# Patient Record
Sex: MALE | Race: WHITE | NOT HISPANIC OR LATINO
[De-identification: names, ages, dates, MRNs, and addresses within clinical notes are randomized per-mention and may not be internally consistent; named-entity substitution may affect disease eponyms.]

---

## 2017-09-20 ENCOUNTER — APPOINTMENT (OUTPATIENT)
Dept: FAMILY MEDICINE | Facility: CLINIC | Age: 62
End: 2017-09-20
Payer: MEDICARE

## 2017-09-20 VITALS
HEART RATE: 84 BPM | SYSTOLIC BLOOD PRESSURE: 128 MMHG | WEIGHT: 200 LBS | OXYGEN SATURATION: 97 % | RESPIRATION RATE: 16 BRPM | HEIGHT: 68 IN | DIASTOLIC BLOOD PRESSURE: 78 MMHG | BODY MASS INDEX: 30.31 KG/M2

## 2017-09-20 DIAGNOSIS — Z82.49 FAMILY HISTORY OF ISCHEMIC HEART DISEASE AND OTHER DISEASES OF THE CIRCULATORY SYSTEM: ICD-10-CM

## 2017-09-20 DIAGNOSIS — Z78.9 OTHER SPECIFIED HEALTH STATUS: ICD-10-CM

## 2017-09-20 DIAGNOSIS — Z87.891 PERSONAL HISTORY OF NICOTINE DEPENDENCE: ICD-10-CM

## 2017-09-20 DIAGNOSIS — M54.5 LOW BACK PAIN: ICD-10-CM

## 2017-09-20 PROCEDURE — 99213 OFFICE O/P EST LOW 20 MIN: CPT

## 2017-09-20 RX ORDER — FLUTICASONE PROPIONATE 50 UG/1
50 SPRAY, METERED NASAL
Qty: 48 | Refills: 0 | Status: ACTIVE | COMMUNITY
Start: 2017-08-23

## 2017-10-25 ENCOUNTER — APPOINTMENT (OUTPATIENT)
Dept: FAMILY MEDICINE | Facility: CLINIC | Age: 62
End: 2017-10-25
Payer: MEDICARE

## 2017-10-25 VITALS
SYSTOLIC BLOOD PRESSURE: 138 MMHG | WEIGHT: 200 LBS | RESPIRATION RATE: 16 BRPM | OXYGEN SATURATION: 98 % | HEIGHT: 68 IN | DIASTOLIC BLOOD PRESSURE: 84 MMHG | BODY MASS INDEX: 30.31 KG/M2 | HEART RATE: 75 BPM | TEMPERATURE: 98.3 F

## 2017-10-25 PROCEDURE — 99213 OFFICE O/P EST LOW 20 MIN: CPT

## 2017-11-09 ENCOUNTER — APPOINTMENT (OUTPATIENT)
Dept: FAMILY MEDICINE | Facility: CLINIC | Age: 62
End: 2017-11-09
Payer: MEDICARE

## 2017-11-09 VITALS
RESPIRATION RATE: 14 BRPM | BODY MASS INDEX: 29.7 KG/M2 | SYSTOLIC BLOOD PRESSURE: 134 MMHG | HEIGHT: 68 IN | WEIGHT: 196 LBS | DIASTOLIC BLOOD PRESSURE: 76 MMHG | HEART RATE: 84 BPM

## 2017-11-09 DIAGNOSIS — M54.12 RADICULOPATHY, CERVICAL REGION: ICD-10-CM

## 2017-11-09 PROCEDURE — 99213 OFFICE O/P EST LOW 20 MIN: CPT

## 2017-12-16 ENCOUNTER — RX RENEWAL (OUTPATIENT)
Age: 62
End: 2017-12-16

## 2018-01-20 ENCOUNTER — RX RENEWAL (OUTPATIENT)
Age: 63
End: 2018-01-20

## 2018-02-09 ENCOUNTER — APPOINTMENT (OUTPATIENT)
Dept: FAMILY MEDICINE | Facility: CLINIC | Age: 63
End: 2018-02-09
Payer: MEDICARE

## 2018-02-09 VITALS
HEIGHT: 68 IN | SYSTOLIC BLOOD PRESSURE: 118 MMHG | BODY MASS INDEX: 29.7 KG/M2 | RESPIRATION RATE: 14 BRPM | DIASTOLIC BLOOD PRESSURE: 64 MMHG | WEIGHT: 196 LBS | HEART RATE: 89 BPM | OXYGEN SATURATION: 97 %

## 2018-02-09 PROCEDURE — 99213 OFFICE O/P EST LOW 20 MIN: CPT

## 2018-02-09 RX ORDER — OXYCODONE 5 MG/1
5 TABLET ORAL
Qty: 30 | Refills: 0 | Status: DISCONTINUED | COMMUNITY
Start: 2017-08-17 | End: 2018-02-09

## 2018-02-09 RX ORDER — PREGABALIN 75 MG/1
75 CAPSULE ORAL
Qty: 60 | Refills: 0 | Status: DISCONTINUED | COMMUNITY
Start: 2017-10-10 | End: 2018-02-09

## 2018-02-09 RX ORDER — GABAPENTIN 300 MG/1
300 CAPSULE ORAL
Qty: 100 | Refills: 0 | Status: DISCONTINUED | COMMUNITY
Start: 2017-09-26 | End: 2018-02-09

## 2018-02-09 RX ORDER — OXYCODONE AND ACETAMINOPHEN 10; 325 MG/1; MG/1
10-325 TABLET ORAL
Qty: 100 | Refills: 0 | Status: DISCONTINUED | COMMUNITY
Start: 2017-07-06 | End: 2018-02-09

## 2018-02-09 RX ORDER — AZELASTINE HYDROCHLORIDE 205.5 UG/1
0.15 SPRAY, METERED NASAL
Qty: 30 | Refills: 0 | Status: DISCONTINUED | COMMUNITY
Start: 2017-05-23 | End: 2018-02-09

## 2018-02-09 RX ORDER — OXYCODONE AND ACETAMINOPHEN 5; 325 MG/1; MG/1
5-325 TABLET ORAL
Qty: 75 | Refills: 0 | Status: DISCONTINUED | COMMUNITY
Start: 2017-10-25 | End: 2018-02-09

## 2018-02-09 RX ORDER — PREGABALIN 150 MG/1
150 CAPSULE ORAL
Qty: 60 | Refills: 0 | Status: DISCONTINUED | COMMUNITY
Start: 2017-12-19 | End: 2018-02-09

## 2018-02-09 RX ORDER — AMOXICILLIN AND CLAVULANATE POTASSIUM 875; 125 MG/1; MG/1
875-125 TABLET, COATED ORAL TWICE DAILY
Qty: 20 | Refills: 0 | Status: DISCONTINUED | COMMUNITY
Start: 2017-10-25 | End: 2018-02-09

## 2018-03-12 ENCOUNTER — RX RENEWAL (OUTPATIENT)
Age: 63
End: 2018-03-12

## 2018-03-13 ENCOUNTER — RX RENEWAL (OUTPATIENT)
Age: 63
End: 2018-03-13

## 2018-05-03 ENCOUNTER — APPOINTMENT (OUTPATIENT)
Dept: FAMILY MEDICINE | Facility: CLINIC | Age: 63
End: 2018-05-03

## 2018-05-11 ENCOUNTER — APPOINTMENT (OUTPATIENT)
Dept: FAMILY MEDICINE | Facility: CLINIC | Age: 63
End: 2018-05-11
Payer: MEDICARE

## 2018-05-11 VITALS
SYSTOLIC BLOOD PRESSURE: 118 MMHG | HEIGHT: 68 IN | BODY MASS INDEX: 31.07 KG/M2 | HEART RATE: 90 BPM | OXYGEN SATURATION: 97 % | DIASTOLIC BLOOD PRESSURE: 72 MMHG | WEIGHT: 205 LBS

## 2018-05-11 DIAGNOSIS — M54.2 CERVICALGIA: ICD-10-CM

## 2018-05-11 PROCEDURE — 99213 OFFICE O/P EST LOW 20 MIN: CPT

## 2018-05-11 RX ORDER — ALPRAZOLAM 0.5 MG/1
0.5 TABLET ORAL
Qty: 75 | Refills: 0 | Status: DISCONTINUED | COMMUNITY
Start: 2017-07-06 | End: 2018-05-11

## 2018-05-19 LAB — HEMOCCULT STL QL IA: NEGATIVE

## 2018-06-09 ENCOUNTER — RX RENEWAL (OUTPATIENT)
Age: 63
End: 2018-06-09

## 2018-06-13 ENCOUNTER — APPOINTMENT (OUTPATIENT)
Dept: FAMILY MEDICINE | Facility: CLINIC | Age: 63
End: 2018-06-13
Payer: MEDICARE

## 2018-06-13 VITALS
WEIGHT: 205 LBS | BODY MASS INDEX: 31.17 KG/M2 | HEART RATE: 85 BPM | OXYGEN SATURATION: 97 % | SYSTOLIC BLOOD PRESSURE: 126 MMHG | DIASTOLIC BLOOD PRESSURE: 70 MMHG

## 2018-06-13 PROCEDURE — 99213 OFFICE O/P EST LOW 20 MIN: CPT

## 2018-06-13 RX ORDER — METHYLPREDNISOLONE 4 MG/1
4 TABLET ORAL
Qty: 80 | Refills: 0 | Status: DISCONTINUED | COMMUNITY
Start: 2017-08-29 | End: 2018-06-13

## 2018-06-13 RX ORDER — ALPRAZOLAM 0.5 MG/1
0.5 TABLET ORAL
Qty: 100 | Refills: 0 | Status: DISCONTINUED | COMMUNITY
Start: 2018-02-09 | End: 2018-06-13

## 2018-06-13 NOTE — PHYSICAL EXAM
[No Acute Distress] : no acute distress [Well Nourished] : well nourished [Well Developed] : well developed [Well-Appearing] : well-appearing [Normal Outer Ear/Nose] : the outer ears and nose were normal in appearance [Normal TMs] : both tympanic membranes were normal [No JVD] : no jugular venous distention [Supple] : supple [No Lymphadenopathy] : no lymphadenopathy [No Respiratory Distress] : no respiratory distress  [Normal Rate] : normal rate  [Regular Rhythm] : with a regular rhythm [No Carotid Bruits] : no carotid bruits [Normal Posterior Cervical Nodes] : no posterior cervical lymphadenopathy [Normal Anterior Cervical Nodes] : no anterior cervical lymphadenopathy [de-identified] : watery nasal drainage [de-identified] : has longhi

## 2018-06-13 NOTE — REVIEW OF SYSTEMS
[Postnasal Drip] : postnasal drip [Nasal Discharge] : nasal discharge [Sore Throat] : sore throat [Cough] : cough [Negative] : Heme/Lymph

## 2018-06-13 NOTE — ASSESSMENT
[FreeTextEntry1] : rx's for cefuroxime 500 mg 1 bid pc and benzonate 100 mg 2 tid prn and drink more water.

## 2018-07-30 ENCOUNTER — RX RENEWAL (OUTPATIENT)
Age: 63
End: 2018-07-30

## 2018-08-13 ENCOUNTER — APPOINTMENT (OUTPATIENT)
Dept: FAMILY MEDICINE | Facility: CLINIC | Age: 63
End: 2018-08-13
Payer: MEDICARE

## 2018-08-13 VITALS
BODY MASS INDEX: 31.07 KG/M2 | OXYGEN SATURATION: 98 % | WEIGHT: 205 LBS | HEIGHT: 68 IN | DIASTOLIC BLOOD PRESSURE: 70 MMHG | HEART RATE: 78 BPM | SYSTOLIC BLOOD PRESSURE: 124 MMHG | RESPIRATION RATE: 14 BRPM

## 2018-08-13 PROCEDURE — 99213 OFFICE O/P EST LOW 20 MIN: CPT

## 2018-08-13 RX ORDER — CEFUROXIME AXETIL 500 MG/1
500 TABLET ORAL
Qty: 20 | Refills: 0 | Status: DISCONTINUED | COMMUNITY
Start: 2018-06-13 | End: 2018-08-13

## 2018-08-13 RX ORDER — OXYCODONE AND ACETAMINOPHEN 10; 325 MG/1; MG/1
10-325 TABLET ORAL
Qty: 50 | Refills: 0 | Status: DISCONTINUED | COMMUNITY
Start: 2018-05-11 | End: 2018-08-13

## 2018-08-13 NOTE — ASSESSMENT
[FreeTextEntry1] : 1. Treatment for chronic pain was renewed - percocet 10/325 # 60 1 bid prn- continue all other treatments to try to reduce pain.  2. HTN - /70 - well controlled on losartan and it is UTD- diet reviewed.

## 2018-08-13 NOTE — PHYSICAL EXAM
[No Acute Distress] : no acute distress [Well Nourished] : well nourished [Well Developed] : well developed [Well-Appearing] : well-appearing [No JVD] : no jugular venous distention [No Lymphadenopathy] : no lymphadenopathy [No Respiratory Distress] : no respiratory distress  [Clear to Auscultation] : lungs were clear to auscultation bilaterally [Normal Rate] : normal rate  [Regular Rhythm] : with a regular rhythm [No Murmur] : no murmur heard [No Carotid Bruits] : no carotid bruits [Normal Posterior Cervical Nodes] : no posterior cervical lymphadenopathy [Normal Anterior Cervical Nodes] : no anterior cervical lymphadenopathy [de-identified] : pain with rom [de-identified] : tender in left lumbar paravertebral muscles , pain with rom

## 2018-08-13 NOTE — HISTORY OF PRESENT ILLNESS
[FreeTextEntry1] : patient presents for 3 month check  [de-identified] : Having daily lower back pain on left side and left hip.  He is seeing pain management - Dr Guerra - getting epidural injections and doing accupuncture - Dr Baca.  Still having pain. Also recheck HTN.

## 2018-09-27 ENCOUNTER — RX RENEWAL (OUTPATIENT)
Age: 63
End: 2018-09-27

## 2018-10-01 ENCOUNTER — RX RENEWAL (OUTPATIENT)
Age: 63
End: 2018-10-01

## 2018-11-13 ENCOUNTER — APPOINTMENT (OUTPATIENT)
Dept: FAMILY MEDICINE | Facility: CLINIC | Age: 63
End: 2018-11-13
Payer: MEDICARE

## 2018-11-13 VITALS
TEMPERATURE: 98.6 F | SYSTOLIC BLOOD PRESSURE: 132 MMHG | HEART RATE: 76 BPM | BODY MASS INDEX: 31.07 KG/M2 | HEIGHT: 68 IN | RESPIRATION RATE: 16 BRPM | DIASTOLIC BLOOD PRESSURE: 80 MMHG | WEIGHT: 205 LBS | OXYGEN SATURATION: 97 %

## 2018-11-13 DIAGNOSIS — Z00.00 ENCOUNTER FOR GENERAL ADULT MEDICAL EXAMINATION W/OUT ABNORMAL FINDINGS: ICD-10-CM

## 2018-11-13 PROCEDURE — G0439: CPT

## 2018-11-13 RX ORDER — OXYCODONE AND ACETAMINOPHEN 10; 325 MG/1; MG/1
10-325 TABLET ORAL
Qty: 60 | Refills: 0 | Status: DISCONTINUED | COMMUNITY
Start: 2018-08-13 | End: 2018-11-13

## 2018-11-13 NOTE — REVIEW OF SYSTEMS
[Joint Pain] : joint pain [Joint Stiffness] : joint stiffness [Back Pain] : back pain [Mole Changes] : mole changes [Negative] : Heme/Lymph

## 2018-11-13 NOTE — HISTORY OF PRESENT ILLNESS
[FreeTextEntry1] : 3 month follow up and would like to discuss results of MRI and upcoming back surgery. [de-identified] : Here for wellness exam  and renewal of Meds. He is fasting for lab.

## 2018-11-13 NOTE — PHYSICAL EXAM
[No Acute Distress] : no acute distress [Well-Appearing] : well-appearing [Normal Sclera/Conjunctiva] : normal sclera/conjunctiva [PERRL] : pupils equal round and reactive to light [EOMI] : extraocular movements intact [Normal Outer Ear/Nose] : the outer ears and nose were normal in appearance [Normal Oropharynx] : the oropharynx was normal [No JVD] : no jugular venous distention [Supple] : supple [No Lymphadenopathy] : no lymphadenopathy [Thyroid Normal, No Nodules] : the thyroid was normal and there were no nodules present [No Respiratory Distress] : no respiratory distress  [Clear to Auscultation] : lungs were clear to auscultation bilaterally [Normal Rate] : normal rate  [Regular Rhythm] : with a regular rhythm [Normal S1, S2] : normal S1 and S2 [No Murmur] : no murmur heard [No Carotid Bruits] : no carotid bruits [No Varicosities] : no varicosities [Pedal Pulses Present] : the pedal pulses are present [No Edema] : there was no peripheral edema [No Extremity Clubbing/Cyanosis] : no extremity clubbing/cyanosis [Soft] : abdomen soft [Non Tender] : non-tender [Non-distended] : non-distended [No Masses] : no abdominal mass palpated [No HSM] : no HSM [Normal Bowel Sounds] : normal bowel sounds [Declined Rectal Exam] : declined rectal exam [Normal Posterior Cervical Nodes] : no posterior cervical lymphadenopathy [Normal Anterior Cervical Nodes] : no anterior cervical lymphadenopathy [No Joint Swelling] : no joint swelling [Grossly Normal Strength/Tone] : grossly normal strength/tone [No Rash] : no rash [Normal Gait] : normal gait [Coordination Grossly Intact] : coordination grossly intact [No Focal Deficits] : no focal deficits [Deep Tendon Reflexes (DTR)] : deep tendon reflexes were 2+ and symmetric [Speech Grossly Normal] : speech grossly normal [Memory Grossly Normal] : memory grossly normal [Normal Affect] : the affect was normal [Alert and Oriented x3] : oriented to person, place, and time [Normal Mood] : the mood was normal [Normal Insight/Judgement] : insight and judgment were intact [de-identified] : overweight [de-identified] : limited exam by body habitus [de-identified] : tender in lumbar spine, pain with rom [de-identified] : Numerous skin changes  just had lesions removed on right chest and right forehead

## 2018-11-13 NOTE — ASSESSMENT
[FreeTextEntry1] : Today I renewed his alprazolam . He gave me a copy of his latest MRI of LS spine and he will be having another lumbar surgery in January by Dr Granados. He is having increased pain. Rx for tramadol 50 mg given - he can use 1-2  q 6h prn - 1 week supply sent in but I stressed only use when needed. Use/precautions /safety reviewed.  Lab done here today as part of his wellness exam. No vaccinations.

## 2018-11-13 NOTE — HEALTH RISK ASSESSMENT
[No falls in past year] : Patient reported no falls in the past year [0] : 2) Feeling down, depressed, or hopeless: Not at all (0) [] : No [de-identified] : rare [[id number]] : 0

## 2018-11-14 LAB
25(OH)D3 SERPL-MCNC: 40.9 NG/ML
ALBUMIN SERPL ELPH-MCNC: 4.2 G/DL
ALP BLD-CCNC: 67 U/L
ALT SERPL-CCNC: 21 U/L
ANION GAP SERPL CALC-SCNC: 14 MMOL/L
AST SERPL-CCNC: 20 U/L
BASOPHILS # BLD AUTO: 0.03 K/UL
BASOPHILS NFR BLD AUTO: 0.2 %
BILIRUB SERPL-MCNC: 0.5 MG/DL
BUN SERPL-MCNC: 16 MG/DL
CALCIUM SERPL-MCNC: 9.6 MG/DL
CHLORIDE SERPL-SCNC: 105 MMOL/L
CHOLEST SERPL-MCNC: 197 MG/DL
CHOLEST/HDLC SERPL: 4.2 RATIO
CK SERPL-CCNC: 73 U/L
CO2 SERPL-SCNC: 23 MMOL/L
CREAT SERPL-MCNC: 0.87 MG/DL
EOSINOPHIL # BLD AUTO: 0.09 K/UL
EOSINOPHIL NFR BLD AUTO: 0.7 %
FOLATE SERPL-MCNC: 19.1 NG/ML
GLUCOSE SERPL-MCNC: 94 MG/DL
HBA1C MFR BLD HPLC: 6.1 %
HCT VFR BLD CALC: 46.6 %
HCV AB SER QL: NONREACTIVE
HCV S/CO RATIO: 0.11 S/CO
HDLC SERPL-MCNC: 47 MG/DL
HGB BLD-MCNC: 15.4 G/DL
IMM GRANULOCYTES NFR BLD AUTO: 0.3 %
LDLC SERPL CALC-MCNC: 122 MG/DL
LYMPHOCYTES # BLD AUTO: 3.61 K/UL
LYMPHOCYTES NFR BLD AUTO: 27.3 %
MAN DIFF?: NORMAL
MCHC RBC-ENTMCNC: 31 PG
MCHC RBC-ENTMCNC: 33 GM/DL
MCV RBC AUTO: 94 FL
MONOCYTES # BLD AUTO: 0.97 K/UL
MONOCYTES NFR BLD AUTO: 7.3 %
NEUTROPHILS # BLD AUTO: 8.46 K/UL
NEUTROPHILS NFR BLD AUTO: 64.2 %
PLATELET # BLD AUTO: 328 K/UL
POTASSIUM SERPL-SCNC: 4.6 MMOL/L
PROT SERPL-MCNC: 7.6 G/DL
PSA SERPL-MCNC: 1.22 NG/ML
RBC # BLD: 4.96 M/UL
RBC # FLD: 16.4 %
SODIUM SERPL-SCNC: 142 MMOL/L
T3 SERPL-MCNC: 107 NG/DL
T3FREE SERPL-MCNC: 3.37 PG/ML
T4 FREE SERPL-MCNC: 1.5 NG/DL
T4 SERPL-MCNC: 7.1 UG/DL
TRIGL SERPL-MCNC: 141 MG/DL
TSH SERPL-ACNC: 1.28 UIU/ML
VIT B12 SERPL-MCNC: 694 PG/ML
WBC # FLD AUTO: 13.2 K/UL

## 2018-11-23 ENCOUNTER — LABORATORY RESULT (OUTPATIENT)
Age: 63
End: 2018-11-23

## 2018-11-28 ENCOUNTER — APPOINTMENT (OUTPATIENT)
Dept: SURGICAL ONCOLOGY | Facility: CLINIC | Age: 63
End: 2018-11-28
Payer: MEDICARE

## 2018-11-28 VITALS
HEART RATE: 88 BPM | OXYGEN SATURATION: 93 % | SYSTOLIC BLOOD PRESSURE: 134 MMHG | DIASTOLIC BLOOD PRESSURE: 91 MMHG | TEMPERATURE: 98.2 F | BODY MASS INDEX: 30.77 KG/M2 | WEIGHT: 203 LBS | HEIGHT: 68 IN

## 2018-11-28 PROCEDURE — 99204 OFFICE O/P NEW MOD 45 MIN: CPT

## 2018-12-11 ENCOUNTER — FORM ENCOUNTER (OUTPATIENT)
Age: 63
End: 2018-12-11

## 2018-12-12 ENCOUNTER — OUTPATIENT (OUTPATIENT)
Dept: OUTPATIENT SERVICES | Facility: HOSPITAL | Age: 63
LOS: 1 days | End: 2018-12-12

## 2018-12-12 ENCOUNTER — APPOINTMENT (OUTPATIENT)
Dept: NUCLEAR MEDICINE | Facility: CLINIC | Age: 63
End: 2018-12-12
Payer: MEDICARE

## 2018-12-12 ENCOUNTER — OUTPATIENT (OUTPATIENT)
Dept: OUTPATIENT SERVICES | Facility: HOSPITAL | Age: 63
LOS: 1 days | End: 2018-12-12
Payer: MEDICARE

## 2018-12-12 DIAGNOSIS — C43.9 MALIGNANT MELANOMA OF SKIN, UNSPECIFIED: ICD-10-CM

## 2018-12-12 PROCEDURE — 78816 PET IMAGE W/CT FULL BODY: CPT | Mod: 26,PI

## 2018-12-13 ENCOUNTER — RESULT REVIEW (OUTPATIENT)
Age: 63
End: 2018-12-13

## 2018-12-13 ENCOUNTER — RX RENEWAL (OUTPATIENT)
Age: 63
End: 2018-12-13

## 2018-12-13 PROCEDURE — 88321 CONSLTJ&REPRT SLD PREP ELSWR: CPT

## 2018-12-14 LAB — SURGICAL PATHOLOGY STUDY: SIGNIFICANT CHANGE UP

## 2019-01-07 ENCOUNTER — APPOINTMENT (OUTPATIENT)
Dept: FAMILY MEDICINE | Facility: CLINIC | Age: 64
End: 2019-01-07
Payer: MEDICARE

## 2019-01-07 VITALS
BODY MASS INDEX: 31.07 KG/M2 | RESPIRATION RATE: 14 BRPM | WEIGHT: 205 LBS | DIASTOLIC BLOOD PRESSURE: 70 MMHG | SYSTOLIC BLOOD PRESSURE: 124 MMHG | HEIGHT: 68 IN | OXYGEN SATURATION: 98 % | HEART RATE: 91 BPM

## 2019-01-07 PROCEDURE — 99214 OFFICE O/P EST MOD 30 MIN: CPT

## 2019-01-07 RX ORDER — BENZONATATE 100 MG/1
100 CAPSULE ORAL 3 TIMES DAILY
Qty: 75 | Refills: 2 | Status: DISCONTINUED | COMMUNITY
Start: 2018-06-13 | End: 2019-01-07

## 2019-01-07 NOTE — PHYSICAL EXAM
[Well Nourished] : well nourished [Well Developed] : well developed [No JVD] : no jugular venous distention [Supple] : supple [No Lymphadenopathy] : no lymphadenopathy [No Respiratory Distress] : no respiratory distress  [Clear to Auscultation] : lungs were clear to auscultation bilaterally [Normal Rate] : normal rate  [Regular Rhythm] : with a regular rhythm [No Carotid Bruits] : no carotid bruits [Soft] : abdomen soft [Non Tender] : non-tender [No HSM] : no HSM [Normal Bowel Sounds] : normal bowel sounds [Normal Posterior Cervical Nodes] : no posterior cervical lymphadenopathy [Normal Anterior Cervical Nodes] : no anterior cervical lymphadenopathy [Speech Grossly Normal] : speech grossly normal [Memory Grossly Normal] : memory grossly normal [Alert and Oriented x3] : oriented to person, place, and time [Normal Mood] : the mood was normal [Normal Insight/Judgement] : insight and judgment were intact [de-identified] : In distress with back pain [de-identified] : 1/6 murmur [de-identified] : very tender in lumbar spine, painful limited ROM [de-identified] : Black macular lesion right areolar [de-identified] : anxious

## 2019-01-07 NOTE — HISTORY OF PRESENT ILLNESS
[No Pertinent Cardiac History] : no history of aortic stenosis, atrial fibrillation, coronary artery disease, recent myocardial infarction, or implantable device/pacemaker [Asthma] : no asthma [COPD] : COPD [Sleep Apnea] : no sleep apnea [Smoker] : not a smoker [Family Member] : no family member with adverse anesthesia reaction/sudden death [Self] : no previous adverse anesthesia reaction [Chronic Anticoagulation] : no chronic anticoagulation [Chronic Kidney Disease] : no chronic kidney disease [Diabetes] : no diabetes [(Patient denies any chest pain, claudication, dyspnea on exertion, orthopnea, palpitations or syncope)] : Patient denies any chest pain, claudication, dyspnea on exertion, orthopnea, palpitations or syncope [Poor (<4 METs)] : Poor (<4 METs) [FreeTextEntry1] : L4-5 laminectomy and fusion             instrumentation and fusion, interbody fusion L5-S1 [FreeTextEntry2] : 01/09/2019 [FreeTextEntry3] : Dr. Zenon Granados at [facility] [FreeTextEntry8] : limited by pain

## 2019-01-07 NOTE — ASSESSMENT
[Patient Optimized for Surgery] : Patient optimized for surgery [No Further Testing Recommended] : no further testing recommended [As per surgery] : as per surgery [FreeTextEntry4] : Presurgical studies; EKG- SR, nospecific ST-T changes - not new, BMP- glucose 113,  UA - small blood rest normal, CBC- normal, PT- 11.0/ PTT- 28.1. BAsed on these acceptable reported results and my exam today he is cleared for planned surgery.

## 2019-01-08 ENCOUNTER — RX RENEWAL (OUTPATIENT)
Age: 64
End: 2019-01-08

## 2019-01-09 ENCOUNTER — RX RENEWAL (OUTPATIENT)
Age: 64
End: 2019-01-09

## 2019-01-15 ENCOUNTER — APPOINTMENT (OUTPATIENT)
Dept: PLASTIC SURGERY | Facility: CLINIC | Age: 64
End: 2019-01-15
Payer: MEDICARE

## 2019-01-15 VITALS
DIASTOLIC BLOOD PRESSURE: 90 MMHG | TEMPERATURE: 98.4 F | WEIGHT: 211 LBS | SYSTOLIC BLOOD PRESSURE: 164 MMHG | BODY MASS INDEX: 31.98 KG/M2 | HEART RATE: 81 BPM | HEIGHT: 68 IN | OXYGEN SATURATION: 99 %

## 2019-01-15 DIAGNOSIS — Z85.820 PERSONAL HISTORY OF MALIGNANT MELANOMA OF SKIN: ICD-10-CM

## 2019-01-15 DIAGNOSIS — C43.9 MALIGNANT MELANOMA OF SKIN, UNSPECIFIED: ICD-10-CM

## 2019-01-15 PROCEDURE — XXXXX: CPT

## 2019-01-15 PROCEDURE — 99203 OFFICE O/P NEW LOW 30 MIN: CPT

## 2019-01-28 ENCOUNTER — OUTPATIENT (OUTPATIENT)
Dept: OUTPATIENT SERVICES | Facility: HOSPITAL | Age: 64
LOS: 1 days | End: 2019-01-28
Payer: MEDICARE

## 2019-01-28 VITALS
HEIGHT: 68 IN | RESPIRATION RATE: 16 BRPM | SYSTOLIC BLOOD PRESSURE: 144 MMHG | DIASTOLIC BLOOD PRESSURE: 88 MMHG | HEART RATE: 78 BPM | TEMPERATURE: 97 F | WEIGHT: 208.78 LBS

## 2019-01-28 DIAGNOSIS — I10 ESSENTIAL (PRIMARY) HYPERTENSION: ICD-10-CM

## 2019-01-28 DIAGNOSIS — Z98.890 OTHER SPECIFIED POSTPROCEDURAL STATES: Chronic | ICD-10-CM

## 2019-01-28 DIAGNOSIS — Z29.9 ENCOUNTER FOR PROPHYLACTIC MEASURES, UNSPECIFIED: ICD-10-CM

## 2019-01-28 DIAGNOSIS — J45.909 UNSPECIFIED ASTHMA, UNCOMPLICATED: ICD-10-CM

## 2019-01-28 DIAGNOSIS — Z96.642 PRESENCE OF LEFT ARTIFICIAL HIP JOINT: Chronic | ICD-10-CM

## 2019-01-28 DIAGNOSIS — C43.9 MALIGNANT MELANOMA OF SKIN, UNSPECIFIED: ICD-10-CM

## 2019-01-28 DIAGNOSIS — Z01.818 ENCOUNTER FOR OTHER PREPROCEDURAL EXAMINATION: ICD-10-CM

## 2019-01-28 DIAGNOSIS — Z98.1 ARTHRODESIS STATUS: Chronic | ICD-10-CM

## 2019-01-28 LAB
ALBUMIN SERPL ELPH-MCNC: 4.3 G/DL — SIGNIFICANT CHANGE UP (ref 3.3–5.2)
ALP SERPL-CCNC: 123 U/L — HIGH (ref 40–120)
ALT FLD-CCNC: 16 U/L — SIGNIFICANT CHANGE UP
ANION GAP SERPL CALC-SCNC: 12 MMOL/L — SIGNIFICANT CHANGE UP (ref 5–17)
APTT BLD: 33.7 SEC — SIGNIFICANT CHANGE UP (ref 27.5–36.3)
AST SERPL-CCNC: 14 U/L — SIGNIFICANT CHANGE UP
BASOPHILS # BLD AUTO: 0 K/UL — SIGNIFICANT CHANGE UP (ref 0–0.2)
BASOPHILS NFR BLD AUTO: 0.4 % — SIGNIFICANT CHANGE UP (ref 0–2)
BILIRUB SERPL-MCNC: 0.2 MG/DL — LOW (ref 0.4–2)
BLD GP AB SCN SERPL QL: SIGNIFICANT CHANGE UP
BUN SERPL-MCNC: 21 MG/DL — HIGH (ref 8–20)
CALCIUM SERPL-MCNC: 9.8 MG/DL — SIGNIFICANT CHANGE UP (ref 8.6–10.2)
CHLORIDE SERPL-SCNC: 106 MMOL/L — SIGNIFICANT CHANGE UP (ref 98–107)
CO2 SERPL-SCNC: 23 MMOL/L — SIGNIFICANT CHANGE UP (ref 22–29)
CREAT SERPL-MCNC: 1.24 MG/DL — SIGNIFICANT CHANGE UP (ref 0.5–1.3)
EOSINOPHIL # BLD AUTO: 0.2 K/UL — SIGNIFICANT CHANGE UP (ref 0–0.5)
EOSINOPHIL NFR BLD AUTO: 2 % — SIGNIFICANT CHANGE UP (ref 0–6)
GLUCOSE SERPL-MCNC: 101 MG/DL — SIGNIFICANT CHANGE UP (ref 70–115)
HCT VFR BLD CALC: 43.1 % — SIGNIFICANT CHANGE UP (ref 42–52)
HCV AB S/CO SERPL IA: 0.16 S/CO — SIGNIFICANT CHANGE UP
HCV AB SERPL-IMP: SIGNIFICANT CHANGE UP
HGB BLD-MCNC: 13.5 G/DL — LOW (ref 14–18)
INR BLD: 1.02 RATIO — SIGNIFICANT CHANGE UP (ref 0.88–1.16)
LDH SERPL L TO P-CCNC: 176 U/L — SIGNIFICANT CHANGE UP (ref 98–192)
LYMPHOCYTES # BLD AUTO: 2.3 K/UL — SIGNIFICANT CHANGE UP (ref 1–4.8)
LYMPHOCYTES # BLD AUTO: 24.1 % — SIGNIFICANT CHANGE UP (ref 20–55)
MCHC RBC-ENTMCNC: 28.7 PG — SIGNIFICANT CHANGE UP (ref 27–31)
MCHC RBC-ENTMCNC: 31.3 G/DL — LOW (ref 32–36)
MCV RBC AUTO: 91.7 FL — SIGNIFICANT CHANGE UP (ref 80–94)
MONOCYTES # BLD AUTO: 1 K/UL — HIGH (ref 0–0.8)
MONOCYTES NFR BLD AUTO: 10.5 % — HIGH (ref 3–10)
NEUTROPHILS # BLD AUTO: 6 K/UL — SIGNIFICANT CHANGE UP (ref 1.8–8)
NEUTROPHILS NFR BLD AUTO: 62.8 % — SIGNIFICANT CHANGE UP (ref 37–73)
PLATELET # BLD AUTO: 424 K/UL — HIGH (ref 150–400)
POTASSIUM SERPL-MCNC: 5.7 MMOL/L — HIGH (ref 3.5–5.3)
POTASSIUM SERPL-SCNC: 5.7 MMOL/L — HIGH (ref 3.5–5.3)
PROT SERPL-MCNC: 7.8 G/DL — SIGNIFICANT CHANGE UP (ref 6.6–8.7)
PROTHROM AB SERPL-ACNC: 11.7 SEC — SIGNIFICANT CHANGE UP (ref 10–12.9)
RBC # BLD: 4.7 M/UL — SIGNIFICANT CHANGE UP (ref 4.6–6.2)
RBC # FLD: 15.1 % — SIGNIFICANT CHANGE UP (ref 11–15.6)
SODIUM SERPL-SCNC: 141 MMOL/L — SIGNIFICANT CHANGE UP (ref 135–145)
TYPE + AB SCN PNL BLD: SIGNIFICANT CHANGE UP
WBC # BLD: 9.5 K/UL — SIGNIFICANT CHANGE UP (ref 4.8–10.8)
WBC # FLD AUTO: 9.5 K/UL — SIGNIFICANT CHANGE UP (ref 4.8–10.8)

## 2019-01-28 PROCEDURE — G0463: CPT

## 2019-01-28 PROCEDURE — 86900 BLOOD TYPING SEROLOGIC ABO: CPT

## 2019-01-28 PROCEDURE — 85027 COMPLETE CBC AUTOMATED: CPT

## 2019-01-28 PROCEDURE — 85610 PROTHROMBIN TIME: CPT

## 2019-01-28 PROCEDURE — 85730 THROMBOPLASTIN TIME PARTIAL: CPT

## 2019-01-28 PROCEDURE — 86901 BLOOD TYPING SEROLOGIC RH(D): CPT

## 2019-01-28 PROCEDURE — 83615 LACTATE (LD) (LDH) ENZYME: CPT

## 2019-01-28 PROCEDURE — 86850 RBC ANTIBODY SCREEN: CPT

## 2019-01-28 PROCEDURE — 93005 ELECTROCARDIOGRAM TRACING: CPT

## 2019-01-28 PROCEDURE — 93010 ELECTROCARDIOGRAM REPORT: CPT

## 2019-01-28 PROCEDURE — 86803 HEPATITIS C AB TEST: CPT

## 2019-01-28 PROCEDURE — 80053 COMPREHEN METABOLIC PANEL: CPT

## 2019-01-28 PROCEDURE — 36415 COLL VENOUS BLD VENIPUNCTURE: CPT

## 2019-01-28 RX ORDER — SODIUM CHLORIDE 9 MG/ML
3 INJECTION INTRAMUSCULAR; INTRAVENOUS; SUBCUTANEOUS ONCE
Qty: 0 | Refills: 0 | Status: DISCONTINUED | OUTPATIENT
Start: 2019-02-11 | End: 2019-02-11

## 2019-01-28 NOTE — H&P PST ADULT - FAMILY HISTORY
Father  Still living? No  Family history of hypertension, Age at diagnosis: Age Unknown  FH: heart disease, Age at diagnosis: Age Unknown

## 2019-01-28 NOTE — H&P PST ADULT - NSANTHOSAYNRD_GEN_A_CORE
No. MILANA screening performed.  STOP BANG Legend: 0-2 = LOW Risk; 3-4 = INTERMEDIATE Risk; 5-8 = HIGH Risk

## 2019-01-28 NOTE — H&P PST ADULT - NS MD HP INPLANTS MED DEV
Artificial joint/Brain/Spinal implant/left hip, neck and back hard ware, right index finger hard ware

## 2019-01-28 NOTE — H&P PST ADULT - ASSESSMENT
medications reviewed, instructions given on what medications to take and what not to take. Asked the patient to take the Blood pressure medication/ heart medication on DOP. medications reviewed, instructions given on what medications to take and what not to take. Asked the patient to take the Blood pressure medication/ heart medication on DOP.   CAPRINI SCORE [CLOT]    AGE RELATED RISK FACTORS                                                       MOBILITY RELATED FACTORS  [ ] Age 41-60 years                                            (1 Point)                  [ ] Bed rest                                                        (1 Point)  [x ] Age: 61-74 years                                           (2 Points)                 [ ] Plaster cast                                                   (2 Points)  [ ] Age= 75 years                                              (3 Points)                 [ ] Bed bound for more than 72 hours                 (2 Points)    DISEASE RELATED RISK FACTORS                                               GENDER SPECIFIC FACTORS  [ ] Edema in the lower extremities                       (1 Point)                  [ ] Pregnancy                                                     (1 Point)  [ ] Varicose veins                                               (1 Point)                  [ ] Post-partum < 6 weeks                                   (1 Point)             [x ] BMI > 25 Kg/m2                                            (1 Point)                  [ ] Hormonal therapy  or oral contraception          (1 Point)                 [ ] Sepsis (in the previous month)                        (1 Point)                  [ ] History of pregnancy complications                 (1 point)  [ ] Pneumonia or serious lung disease                                               [ ] Unexplained or recurrent                     (1 Point)           (in the previous month)                               (1 Point)  [ ] Abnormal pulmonary function test                     (1 Point)                 SURGERY RELATED RISK FACTORS  [ ] Acute myocardial infarction                              (1 Point)                 [ ]  Section                                             (1 Point)  [ ] Congestive heart failure (in the previous month)  (1 Point)               [ ] Minor surgery                                                  (1 Point)   [ ] Inflammatory bowel disease                             (1 Point)                 [ ] Arthroscopic surgery                                        (2 Points)  [ ] Central venous access                                      (2 Points)                [x ] General surgery lasting more than 45 minutes   (2 Points)       [ ] Stroke (in the previous month)                          (5 Points)               [ ] Elective arthroplasty                                         (5 Points)                                                                                                                                               HEMATOLOGY RELATED FACTORS                                                 TRAUMA RELATED RISK FACTORS  [ ] Prior episodes of VTE                                     (3 Points)                 [ ] Fracture of the hip, pelvis, or leg                       (5 Points)  [ ] Positive family history for VTE                         (3 Points)                 [ ] Acute spinal cord injury (in the previous month)  (5 Points)  [ ] Prothrombin 34912 A                                     (3 Points)                 [ ] Paralysis  (less than 1 month)                             (5 Points)  [ ] Factor V Leiden                                             (3 Points)                  [ ] Multiple Trauma within 1 month                        (5 Points)  [ ] Lupus anticoagulants                                     (3 Points)                                                           [ ] Anticardiolipin antibodies                               (3 Points)                                                       [ ] High homocysteine in the blood                      (3 Points)                                             [ ] Other congenital or acquired thrombophilia      (3 Points)                                                [ ] Heparin induced thrombocytopenia                  (3 Points)                                          Total Score [   5    OPIOID RISK TOOL    CHANEL EACH BOX THAT APPLIES AND ADD TOTALS AT THE END    FAMILY HISTORY OF SUBSTANCE ABUSE                 FEMALE         MALE                                                Alcohol                             [  ]1 pt          [  ]3pts                                               Illegal Drugs                     [  ]2 pts        [  ]3pts                                               Rx Drugs                           [  ]4 pts        [  ]4 pts    PERSONAL HISTORY OF SUBSTANCE ABUSE                                                                                          Alcohol                             [  ]3 pts       [  ]3 pts                                               Illegal Drugs                     [  ]4 pts        [  ]4 pts                                               Rx Drugs                           [  ]5 pts        [  ]5 pts    AGE BETWEEN 16-45 YEARS                                      [  ]1 pt         [  ]1 pt    HISTORY OF PREADOLESCENT   SEXUAL ABUSE                                                             [  ]3 pts        [  ]0pts    PSYCHOLOGICAL DISEASE                     ADD, OCD, Bipolar, Schizophrenia        [  ]2 pts         [  ]2 pts                      Depression                                               [  ]1 pt           [  ]1 pt           SCORING TOTAL   (add numbers and type here)              ( 0)                                     A score of 3 or lower indicated LOW risk for future opioid abuse  A score of 4 to 7 indicated moderate risk for future opioid abuse  A score of 8 or higher indicates a high risk for opioid abuse       ]

## 2019-01-28 NOTE — H&P PST ADULT - PSH
H/O neck surgery  with hard ware 2017  H/O spinal fusion  lumbar with hard ware 2019  History of back surgery  2014  History of hip replacement, total, left  2005  History of shoulder surgery  left 2015

## 2019-01-28 NOTE — H&P PST ADULT - HISTORY OF PRESENT ILLNESS
63 year old male 63 year old male who states that he has a skin lesion on his right nipple which is Melanoma, he had this for few years but went to the dermatologist recently

## 2019-01-28 NOTE — H&P PST ADULT - ATTENDING COMMENTS
Planned procedure including risks and benefits discussed with patient.     Past Medical History:  Asthma    Hypertension.     Past Surgical History:  H/O neck surgery  with hard ware 2017  H/O spinal fusion  lumbar with hard ware 2019  History of back surgery  2014  History of hip replacement, total, left  2005  History of shoulder surgery  left 2015.

## 2019-01-29 ENCOUNTER — APPOINTMENT (OUTPATIENT)
Dept: FAMILY MEDICINE | Facility: CLINIC | Age: 64
End: 2019-01-29
Payer: MEDICARE

## 2019-01-29 PROCEDURE — 36415 COLL VENOUS BLD VENIPUNCTURE: CPT

## 2019-01-30 LAB — POTASSIUM SERPL-SCNC: 4.8 MMOL/L

## 2019-02-05 ENCOUNTER — APPOINTMENT (OUTPATIENT)
Dept: FAMILY MEDICINE | Facility: CLINIC | Age: 64
End: 2019-02-05
Payer: MEDICARE

## 2019-02-05 VITALS
SYSTOLIC BLOOD PRESSURE: 120 MMHG | DIASTOLIC BLOOD PRESSURE: 80 MMHG | WEIGHT: 211 LBS | HEIGHT: 68 IN | BODY MASS INDEX: 31.98 KG/M2

## 2019-02-05 VITALS — OXYGEN SATURATION: 98 % | HEART RATE: 90 BPM | RESPIRATION RATE: 14 BRPM

## 2019-02-05 DIAGNOSIS — Z82.49 FAMILY HISTORY OF ISCHEMIC HEART DISEASE AND OTHER DISEASES OF THE CIRCULATORY SYSTEM: ICD-10-CM

## 2019-02-05 DIAGNOSIS — Z86.79 PERSONAL HISTORY OF OTHER DISEASES OF THE CIRCULATORY SYSTEM: ICD-10-CM

## 2019-02-05 DIAGNOSIS — Z85.820 PERSONAL HISTORY OF MALIGNANT MELANOMA OF SKIN: ICD-10-CM

## 2019-02-05 DIAGNOSIS — Z87.39 PERSONAL HISTORY OF OTHER DISEASES OF THE MUSCULOSKELETAL SYSTEM AND CONNECTIVE TISSUE: ICD-10-CM

## 2019-02-05 PROBLEM — J45.909 UNSPECIFIED ASTHMA, UNCOMPLICATED: Chronic | Status: ACTIVE | Noted: 2019-01-28

## 2019-02-05 PROBLEM — I10 ESSENTIAL (PRIMARY) HYPERTENSION: Chronic | Status: ACTIVE | Noted: 2019-01-28

## 2019-02-05 PROCEDURE — 99214 OFFICE O/P EST MOD 30 MIN: CPT

## 2019-02-05 RX ORDER — DICLOFENAC POTASSIUM 50 MG/1
50 TABLET, COATED ORAL
Qty: 180 | Refills: 1 | Status: DISCONTINUED | COMMUNITY
Start: 2017-09-13 | End: 2019-02-05

## 2019-02-05 RX ORDER — LEVOCETIRIZINE DIHYDROCHLORIDE 5 MG/1
5 TABLET, FILM COATED ORAL
Refills: 0 | Status: DISCONTINUED | COMMUNITY
End: 2019-02-05

## 2019-02-05 RX ORDER — SIMVASTATIN 40 MG/1
40 TABLET, FILM COATED ORAL
Refills: 0 | Status: DISCONTINUED | COMMUNITY
End: 2019-02-05

## 2019-02-05 RX ORDER — OXYCODONE HYDROCHLORIDE 30 MG/1
TABLET ORAL
Refills: 0 | Status: DISCONTINUED | COMMUNITY
End: 2019-02-05

## 2019-02-05 NOTE — HISTORY OF PRESENT ILLNESS
[No Pertinent Cardiac History] : no history of aortic stenosis, atrial fibrillation, coronary artery disease, recent myocardial infarction, or implantable device/pacemaker [COPD] : COPD [No Adverse Anesthesia Reaction] : no adverse anesthesia reaction in self or family member [(Patient denies any chest pain, claudication, dyspnea on exertion, orthopnea, palpitations or syncope)] : Patient denies any chest pain, claudication, dyspnea on exertion, orthopnea, palpitations or syncope [Poor (<4 METs)] : Poor (<4 METs) [Family Member] : no family member with adverse anesthesia reaction/sudden death [Self] : no previous adverse anesthesia reaction [Chronic Anticoagulation] : no chronic anticoagulation [Chronic Kidney Disease] : no chronic kidney disease [Diabetes] : no diabetes [FreeTextEntry1] : resection of a melanoma [FreeTextEntry2] : 2/11/2019 [FreeTextEntry3] : Dr Venkatesh Johnson at [facility] [FreeTextEntry4] : pt presents for medical clearance

## 2019-02-05 NOTE — PHYSICAL EXAM
[No Acute Distress] : no acute distress [Well Nourished] : well nourished [Well Developed] : well developed [Well-Appearing] : well-appearing [No JVD] : no jugular venous distention [Supple] : supple [No Lymphadenopathy] : no lymphadenopathy [No Respiratory Distress] : no respiratory distress  [Clear to Auscultation] : lungs were clear to auscultation bilaterally [Normal Rate] : normal rate  [Regular Rhythm] : with a regular rhythm [No Murmur] : no murmur heard [No Carotid Bruits] : no carotid bruits [Soft] : abdomen soft [Non Tender] : non-tender [No HSM] : no HSM [Normal Bowel Sounds] : normal bowel sounds [Normal Posterior Cervical Nodes] : no posterior cervical lymphadenopathy [Normal Anterior Cervical Nodes] : no anterior cervical lymphadenopathy [Speech Grossly Normal] : speech grossly normal [Memory Grossly Normal] : memory grossly normal [Normal Affect] : the affect was normal [Alert and Oriented x3] : oriented to person, place, and time [Normal Mood] : the mood was normal [Normal Insight/Judgement] : insight and judgment were intact [de-identified] : tender in lumbar spine [de-identified] : dark brown/black lesion right upper chest [de-identified] : antalgic gait with a cane

## 2019-02-05 NOTE — REVIEW OF SYSTEMS
[Dyspnea on Exertion] : dyspnea on exertion [Joint Pain] : joint pain [Joint Stiffness] : joint stiffness [Back Pain] : back pain [Anxiety] : anxiety [Negative] : Heme/Lymph

## 2019-02-05 NOTE — ASSESSMENT
[Patient Optimized for Surgery] : Patient optimized for surgery [No Further Testing Recommended] : no further testing recommended [As per surgery] : as per surgery [FreeTextEntry4] : Presurgical studies;  CBC- Hgb 13.5 L and platelets 424,000 H, PT-11.7/ PTT- 33.7,  BMP- potassium  5.7 H but was repeated  4.8, LFT's - normal, Hep C- nonreactive  EKG- SR, normal.  BAsed on these reported results and my exam today he is CLEARED for planned surgery

## 2019-02-10 ENCOUNTER — FORM ENCOUNTER (OUTPATIENT)
Age: 64
End: 2019-02-10

## 2019-02-11 ENCOUNTER — RESULT REVIEW (OUTPATIENT)
Age: 64
End: 2019-02-11

## 2019-02-11 ENCOUNTER — OUTPATIENT (OUTPATIENT)
Dept: OUTPATIENT SERVICES | Facility: HOSPITAL | Age: 64
LOS: 1 days | End: 2019-02-11
Payer: MEDICARE

## 2019-02-11 ENCOUNTER — APPOINTMENT (OUTPATIENT)
Dept: SURGICAL ONCOLOGY | Facility: HOSPITAL | Age: 64
End: 2019-02-11

## 2019-02-11 VITALS
OXYGEN SATURATION: 96 % | TEMPERATURE: 98 F | RESPIRATION RATE: 15 BRPM | DIASTOLIC BLOOD PRESSURE: 63 MMHG | SYSTOLIC BLOOD PRESSURE: 127 MMHG | HEART RATE: 95 BPM

## 2019-02-11 VITALS
RESPIRATION RATE: 16 BRPM | OXYGEN SATURATION: 98 % | HEIGHT: 68 IN | TEMPERATURE: 99 F | HEART RATE: 92 BPM | SYSTOLIC BLOOD PRESSURE: 106 MMHG | WEIGHT: 207.9 LBS | DIASTOLIC BLOOD PRESSURE: 90 MMHG

## 2019-02-11 DIAGNOSIS — Z98.1 ARTHRODESIS STATUS: Chronic | ICD-10-CM

## 2019-02-11 DIAGNOSIS — Z96.642 PRESENCE OF LEFT ARTIFICIAL HIP JOINT: Chronic | ICD-10-CM

## 2019-02-11 DIAGNOSIS — Z98.890 OTHER SPECIFIED POSTPROCEDURAL STATES: Chronic | ICD-10-CM

## 2019-02-11 DIAGNOSIS — C43.9 MALIGNANT MELANOMA OF SKIN, UNSPECIFIED: ICD-10-CM

## 2019-02-11 LAB
ABO RH CONFIRMATION: SIGNIFICANT CHANGE UP
POTASSIUM SERPL-MCNC: 4.8 MMOL/L — SIGNIFICANT CHANGE UP (ref 3.5–5.3)
POTASSIUM SERPL-SCNC: 4.8 MMOL/L — SIGNIFICANT CHANGE UP (ref 3.5–5.3)

## 2019-02-11 PROCEDURE — 88341 IMHCHEM/IMCYTCHM EA ADD ANTB: CPT

## 2019-02-11 PROCEDURE — 38525 BIOPSY/REMOVAL LYMPH NODES: CPT | Mod: RT

## 2019-02-11 PROCEDURE — 14001 TIS TRNFR TRUNK 10.1-30SQCM: CPT

## 2019-02-11 PROCEDURE — 21558 RESECT NECK TUMOR 5 CM/>: CPT

## 2019-02-11 PROCEDURE — C1889: CPT

## 2019-02-11 PROCEDURE — 38308 INCISION OF LYMPH CHANNELS: CPT

## 2019-02-11 PROCEDURE — 88307 TISSUE EXAM BY PATHOLOGIST: CPT

## 2019-02-11 PROCEDURE — 88305 TISSUE EXAM BY PATHOLOGIST: CPT | Mod: 26

## 2019-02-11 PROCEDURE — 36415 COLL VENOUS BLD VENIPUNCTURE: CPT

## 2019-02-11 PROCEDURE — 38740 REMOVE ARMPIT LYMPH NODES: CPT

## 2019-02-11 PROCEDURE — 38900 IO MAP OF SENT LYMPH NODE: CPT

## 2019-02-11 PROCEDURE — 88341 IMHCHEM/IMCYTCHM EA ADD ANTB: CPT | Mod: 26

## 2019-02-11 PROCEDURE — 84132 ASSAY OF SERUM POTASSIUM: CPT

## 2019-02-11 PROCEDURE — 88342 IMHCHEM/IMCYTCHM 1ST ANTB: CPT

## 2019-02-11 PROCEDURE — 88307 TISSUE EXAM BY PATHOLOGIST: CPT | Mod: 26

## 2019-02-11 PROCEDURE — A9541: CPT

## 2019-02-11 PROCEDURE — 78195 LYMPH SYSTEM IMAGING: CPT

## 2019-02-11 PROCEDURE — 14301 TIS TRNFR ANY 30.1-60 SQ CM: CPT

## 2019-02-11 PROCEDURE — 88305 TISSUE EXAM BY PATHOLOGIST: CPT

## 2019-02-11 PROCEDURE — 88342 IMHCHEM/IMCYTCHM 1ST ANTB: CPT | Mod: 26

## 2019-02-11 PROCEDURE — 78195 LYMPH SYSTEM IMAGING: CPT | Mod: 26

## 2019-02-11 PROCEDURE — 38790 INJECT FOR LYMPHATIC X-RAY: CPT

## 2019-02-11 PROCEDURE — 38792 RA TRACER ID OF SENTINL NODE: CPT | Mod: RT

## 2019-02-11 PROCEDURE — 38525 BIOPSY/REMOVAL LYMPH NODES: CPT

## 2019-02-11 RX ORDER — ONDANSETRON 8 MG/1
4 TABLET, FILM COATED ORAL ONCE
Qty: 0 | Refills: 0 | Status: DISCONTINUED | OUTPATIENT
Start: 2019-02-11 | End: 2019-02-11

## 2019-02-11 RX ORDER — FENTANYL CITRATE 50 UG/ML
50 INJECTION INTRAVENOUS
Qty: 0 | Refills: 0 | Status: DISCONTINUED | OUTPATIENT
Start: 2019-02-11 | End: 2019-02-11

## 2019-02-11 RX ORDER — DICLOFENAC SODIUM 75 MG/1
1 TABLET, DELAYED RELEASE ORAL
Qty: 0 | Refills: 0 | COMMUNITY

## 2019-02-11 RX ORDER — SIMVASTATIN 20 MG/1
1 TABLET, FILM COATED ORAL
Qty: 0 | Refills: 0 | COMMUNITY

## 2019-02-11 RX ORDER — LOSARTAN POTASSIUM 100 MG/1
1 TABLET, FILM COATED ORAL
Qty: 0 | Refills: 0 | COMMUNITY

## 2019-02-11 RX ORDER — LEVOCETIRIZINE DIHYDROCHLORIDE 0.5 MG/ML
1 SOLUTION ORAL
Qty: 0 | Refills: 0 | COMMUNITY

## 2019-02-11 RX ORDER — SODIUM CHLORIDE 9 MG/ML
1000 INJECTION INTRAMUSCULAR; INTRAVENOUS; SUBCUTANEOUS
Qty: 0 | Refills: 0 | Status: DISCONTINUED | OUTPATIENT
Start: 2019-02-11 | End: 2019-02-11

## 2019-02-11 NOTE — ASU DISCHARGE PLAN (ADULT/PEDIATRIC). - MEDICATION SUMMARY - MEDICATIONS TO TAKE
I will START or STAY ON the medications listed below when I get home from the hospital:    Percocet 5/325 oral tablet  -- 1 tab(s) by mouth every 4 hours MDD:6  -- Caution federal law prohibits the transfer of this drug to any person other  than the person for whom it was prescribed.  May cause drowsiness.  Alcohol may intensify this effect.  Use care when operating dangerous machinery.  This prescription cannot be refilled.  This product contains acetaminophen.  Do not use  with any other product containing acetaminophen to prevent possible liver damage.  Using more of this medication than prescribed may cause serious breathing problems.    -- Indication: For post operative incisional pain    diclofenac potassium 50 mg oral tablet  -- 1 tab(s) by mouth 2 times a day, As Needed  -- Indication: For home med    losartan 100 mg oral tablet  -- 1 tab(s) by mouth once a day  -- Indication: For home med    Lyrica 50 mg oral capsule  -- 2 cap(s) by mouth 2 times a day  -- Indication: For home med    levocetirizine 5 mg oral tablet  -- 1 tab(s) by mouth once a day (in the evening)  -- Indication: For home med    simvastatin 40 mg oral tablet  -- 1 tab(s) by mouth once a day (at bedtime)  -- Indication: For home med    busPIRone 10 mg oral tablet  -- 1 tab(s) by mouth 2 times a day  -- Indication: For home med

## 2019-02-11 NOTE — ASU PREOP CHECKLIST - TAMPON REMOVED
Chief Complaints and History of Present Illnesses   Patient presents with     Post Op (Ophthalmology) Right Eye     POD # 1 s/p secondary IOL RE     HPI    Affected eye(s):  Right   Symptoms:     No floaters   No flashes   No redness   No Dryness         Do you have eye pain now?:  No      Comments:  Pt slept well last night. No eye pain yesterday or today.    Tan DUNN February 16, 2017 3:20 PM               
n/a

## 2019-02-11 NOTE — BRIEF OPERATIVE NOTE - OPERATION/FINDINGS
sentinel lymph node confirmed removed with radiotracer. 1cm wide margin with elliptical incision. Short superior Long lateral

## 2019-02-11 NOTE — BRIEF OPERATIVE NOTE - PROCEDURE
<<-----Click on this checkbox to enter Procedure [place] lymph node biopsy  02/11/2019    Active  VWANG  Excision of melanoma  02/11/2019  right nipple  Active  VWANG

## 2019-02-20 ENCOUNTER — APPOINTMENT (OUTPATIENT)
Dept: SURGICAL ONCOLOGY | Facility: CLINIC | Age: 64
End: 2019-02-20
Payer: MEDICARE

## 2019-02-20 VITALS
BODY MASS INDEX: 31.32 KG/M2 | OXYGEN SATURATION: 100 % | WEIGHT: 206 LBS | HEART RATE: 77 BPM | TEMPERATURE: 97.5 F | DIASTOLIC BLOOD PRESSURE: 84 MMHG | SYSTOLIC BLOOD PRESSURE: 139 MMHG

## 2019-02-20 PROCEDURE — 99024 POSTOP FOLLOW-UP VISIT: CPT

## 2019-02-20 NOTE — PHYSICAL EXAM
[de-identified] : Right chest wall incision healing well with no erythema, induration or drainage.

## 2019-02-20 NOTE — CONSULT LETTER
[Dear  ___] : Dear  [unfilled], [Consult Letter:] : I had the pleasure of evaluating your patient, [unfilled]. [Consult Closing:] : Thank you very much for allowing me to participate in the care of this patient.  If you have any questions, please do not hesitate to contact me. [Sincerely,] : Sincerely, [DrJessie  ___] : Dr. FLANAGAN [FreeTextEntry2] : Kenyon Hodges MD [FreeTextEntry1] : 63 year-old male presents for an initial postop visit, status post wide excision of melanoma from the right chest wall with right axillary sentinel node biopsy on 2/11/19.  Final pathology is pending.  Today he is feeling well.  He denies significant pain, fever, chills, erythema or drainage from the incision. \par \par Previous pertinent history is as follows:\par \par He was initially seen in consultation on 11/28/18.  He noticed a darkly pigmented lesion involving his right chest/nipple region approximately 4 years ago which continued to increase in size.  He ultimately saw his dermatologist on 11/12/18 who biopsied a lesion on his forehead, and biopsied the lateral and medial aspect of the chest wall lesion with the following pathology.\par \par (1) Forehead: squamous cell carcinoma\par (2) Right chest: melanoma with extensive regression, 0.5 mm, no ulceration, mitotic rate < 1/mm^2\par (3) Right chest: melanoma with extensive regression, 0.55 mm, no ulceration, mitotic rate < 1/mm^2\par \par PET/CT on 12/12/18 showed no evidence of metastatic disease.\par \par He denies any prior history of melanoma.  His past medical history includes HTN, HLD, mild intermittent asthma, and herniated discs, and spondylolisthesis.  He has had multiple orthopedic surgeries in the past and denies any prior issues with anesthesia. \par \par \par Referring MD/Derm: Dr. Kenyon oHdges\par PCP: Dr. Venkatesh Kaye [FreeTextEntry3] : Venkatesh Johnson MD, FACS, FASCRS\par , Department of Surgery\par Director of the [facility]\par , Minimally Invasive/Robotic Cancer Surgery, [facility] & [facility] Divisions\par Division of Surgical Oncology

## 2019-02-20 NOTE — REASON FOR VISIT
[Post-Op] : a post-op for [FreeTextEntry2] : status post wide excision of melanoma from the right chest wall with right axillary sentinel node biopsy on 2/11/19

## 2019-02-20 NOTE — HISTORY OF PRESENT ILLNESS
[de-identified] : 63 year-old male presents for an initial postop visit, status post wide excision of melanoma from the right chest wall with right axillary sentinel node biopsy on 2/11/19.  Final pathology is pending.  Today he is feeling well.  He denies significant pain, fever, chills, erythema or drainage from the incision. \par \par Previous pertinent history is as follows:\par \par He was initially seen in consultation on 11/28/18.  He noticed a darkly pigmented lesion involving his right chest/nipple region approximately 4 years ago which continued to increase in size.  He ultimately saw his dermatologist on 11/12/18 who biopsied a lesion on his forehead, and biopsied the lateral and medial aspect of the chest wall lesion with the following pathology.\par \par (1) Forehead: squamous cell carcinoma\par (2) Right chest: melanoma with extensive regression, 0.5 mm, no ulceration, mitotic rate < 1/mm^2\par (3) Right chest: melanoma with extensive regression, 0.55 mm, no ulceration, mitotic rate < 1/mm^2\par \par PET/CT on 12/12/18 showed no evidence of metastatic disease.\par \par He denies any prior history of melanoma.  His past medical history includes HTN, HLD, mild intermittent asthma, and herniated discs, and spondylolisthesis.  He has had multiple orthopedic surgeries in the past and denies any prior issues with anesthesia. \par \par \par Referring MD/Derm: Dr. Kenyon Hodges\par PCP: Dr. Venkatesh Kaye

## 2019-02-25 ENCOUNTER — APPOINTMENT (OUTPATIENT)
Dept: FAMILY MEDICINE | Facility: CLINIC | Age: 64
End: 2019-02-25
Payer: MEDICARE

## 2019-02-25 VITALS
SYSTOLIC BLOOD PRESSURE: 130 MMHG | HEIGHT: 68 IN | DIASTOLIC BLOOD PRESSURE: 78 MMHG | WEIGHT: 206 LBS | HEART RATE: 93 BPM | OXYGEN SATURATION: 96 % | TEMPERATURE: 98.7 F | RESPIRATION RATE: 14 BRPM | BODY MASS INDEX: 31.22 KG/M2

## 2019-02-25 DIAGNOSIS — L03.313 CELLULITIS OF CHEST WALL: ICD-10-CM

## 2019-02-25 LAB — SURGICAL PATHOLOGY STUDY: SIGNIFICANT CHANGE UP

## 2019-02-25 PROCEDURE — 99213 OFFICE O/P EST LOW 20 MIN: CPT

## 2019-02-25 NOTE — REVIEW OF SYSTEMS
[Joint Pain] : joint pain [Joint Stiffness] : joint stiffness [Negative] : Heme/Lymph [de-identified] : swelling and redness at surgical site

## 2019-02-25 NOTE — ASSESSMENT
[FreeTextEntry1] : I advised pt to use heat to the area for 20-30 minutes at a time  4 times a day and rx for cefadroxil 500 mg 1 bid pc.  Advised him to call me on Thursday but he can call anytime with concerns.

## 2019-02-25 NOTE — HISTORY OF PRESENT ILLNESS
[___ Days ago] : [unfilled] days ago [FreeTextEntry8] : Pt had melanoma excision right upper chest. No whe notes some swelling and redness.

## 2019-02-25 NOTE — PHYSICAL EXAM
[No Acute Distress] : no acute distress [Well Nourished] : well nourished [Well Developed] : well developed [Well-Appearing] : well-appearing [No JVD] : no jugular venous distention [Supple] : supple [No Lymphadenopathy] : no lymphadenopathy [No Respiratory Distress] : no respiratory distress  [Clear to Auscultation] : lungs were clear to auscultation bilaterally [Normal Rate] : normal rate  [Regular Rhythm] : with a regular rhythm [No Murmur] : no murmur heard [No Carotid Bruits] : no carotid bruits [Normal Posterior Cervical Nodes] : no posterior cervical lymphadenopathy [Normal Anterior Cervical Nodes] : no anterior cervical lymphadenopathy [de-identified] : large healing surgical site right upper chest -  lateral aspect of  suture line is swollen and red,

## 2019-02-26 ENCOUNTER — APPOINTMENT (OUTPATIENT)
Dept: FAMILY MEDICINE | Facility: CLINIC | Age: 64
End: 2019-02-26

## 2019-02-26 ENCOUNTER — APPOINTMENT (OUTPATIENT)
Dept: SURGICAL ONCOLOGY | Facility: CLINIC | Age: 64
End: 2019-02-26
Payer: MEDICARE

## 2019-02-26 VITALS
HEIGHT: 68 IN | HEART RATE: 77 BPM | DIASTOLIC BLOOD PRESSURE: 83 MMHG | SYSTOLIC BLOOD PRESSURE: 123 MMHG | WEIGHT: 206 LBS | BODY MASS INDEX: 31.22 KG/M2

## 2019-02-26 PROCEDURE — 99212 OFFICE O/P EST SF 10 MIN: CPT

## 2019-02-28 NOTE — REVIEW OF SYSTEMS
[Negative] : Heme/Lymph [de-identified] : right chest wall incision red, warm, hard, swollen, with white drainage

## 2019-02-28 NOTE — HISTORY OF PRESENT ILLNESS
[de-identified] : Mr. Crowley presents with post-op evaluation following wide excision of a right chest well melanoma by Dr. Johnson on 2/11/19. He was already seen by Dr. Johnson last week but pathology was not yet available, and since then he reports that his right chest wall wound had grown in size, become red, hard, and began draining pus. He reports that he saw his PMD who prescribed antibiotics.\par \par He denies fevers or chills. He reports that after the wound began draining the swelling and redness of his chest has greatly improved.

## 2019-02-28 NOTE — ASSESSMENT
[FreeTextEntry1] : 63 year old man s/p WLE of right chest wall melanoma. Margins negative, axillary sentinel lymph node biopsy without metastasis. Pathology report given to patient and results explained. \par \par Wound examined. Given that there is no drainage and I cannot palpate any underlying fluid, and the fact that the patient reports considerable improvement in the last 24 hours, I believe it is appropriate for him to finish his course of cefodroxil for 10 days. He already had an appt with Dr. Johnson for 2 weeks from now but I counseled him to return sooner for evaluation should he develop fevers, chills, lethargy, or worsening wound swelling redness, or drainage.

## 2019-02-28 NOTE — PHYSICAL EXAM
[Normal] : supple, no neck mass and thyroid not enlarged [Normal Neck Lymph Nodes] : normal neck lymph nodes  [Normal Supraclavicular Lymph Nodes] : normal supraclavicular lymph nodes [Normal Groin Lymph Nodes] : normal groin lymph nodes [Normal Axillary Lymph Nodes] : normal axillary lymph nodes [Normal] : oriented to person, place and time, with appropriate affect [de-identified] : right transverse chest wall/breast incision with moderate erythema and induration, however there is no underlying fluid collection, palpable mass, or draining sinus.

## 2019-03-10 ENCOUNTER — RX RENEWAL (OUTPATIENT)
Age: 64
End: 2019-03-10

## 2019-03-13 ENCOUNTER — APPOINTMENT (OUTPATIENT)
Dept: SURGICAL ONCOLOGY | Facility: CLINIC | Age: 64
End: 2019-03-13
Payer: MEDICARE

## 2019-03-13 VITALS
WEIGHT: 208.31 LBS | SYSTOLIC BLOOD PRESSURE: 129 MMHG | BODY MASS INDEX: 31.57 KG/M2 | DIASTOLIC BLOOD PRESSURE: 79 MMHG | HEIGHT: 68 IN | HEART RATE: 79 BPM

## 2019-03-13 PROCEDURE — 99024 POSTOP FOLLOW-UP VISIT: CPT

## 2019-03-13 NOTE — CONSULT LETTER
[Dear  ___] : Dear  [unfilled], [Consult Letter:] : I had the pleasure of evaluating your patient, [unfilled]. [Consult Closing:] : Thank you very much for allowing me to participate in the care of this patient.  If you have any questions, please do not hesitate to contact me. [Sincerely,] : Sincerely, [DrJessie  ___] : Dr. FLANAGAN [FreeTextEntry2] : Kenyon Hodges MD [FreeTextEntry1] : 63 year-old male returns for postop care, status post wide excision of melanoma from the right chest wall with right axillary sentinel node biopsy on 2/11/19.  Final pathology was residual 1.5 mm melanoma with one negative right axillary sentinel lymph node and negative margins (T2aN0).  \par \par He saw his PCP regarding erythema and induration surrounding the incision with purulent drainage, and was prescribed a course of cefadroxil.  He followed up with my partner in the office on 2/26/19 at which point he described improvement in the erythema on antibiotics.  Evidently there was no fluctuance, underlying fluid collection or draining sinus on exam so he was advised to continue antibiotics and follow up with me today. \par \par Today he is feeling well.  He has completed antibiotics and the erythema around his incision has resolved.\par \par Previous pertinent history is as follows:\par \par He was initially seen in consultation on 11/28/18.  He noticed a darkly pigmented lesion involving his right chest/nipple region approximately 4 years ago which continued to increase in size.  He ultimately saw his dermatologist on 11/12/18 who biopsied a lesion on his forehead, and biopsied the lateral and medial aspect of the chest wall lesion with the following pathology.\par \par (1) Forehead: squamous cell carcinoma\par (2) Right chest: melanoma with extensive regression, 0.5 mm, no ulceration, mitotic rate < 1/mm^2\par (3) Right chest: melanoma with extensive regression, 0.55 mm, no ulceration, mitotic rate < 1/mm^2\par \par PET/CT on 12/12/18 showed no evidence of metastatic disease.\par \par He denies any prior history of melanoma.  His past medical history includes HTN, HLD, mild intermittent asthma, and herniated discs, and spondylolisthesis.  He has had multiple orthopedic surgeries in the past and denies any prior issues with anesthesia. \par \par A&P:\par Continue dermatologic surveillance.\par \par Referring MD/Derm: Dr. Kenyon Hodges\par PCP: Dr. Venkatesh Kaye [FreeTextEntry3] : Venkatesh Johnson MD, FACS, FASCRS\par , Department of Surgery\par Director of the [facility] [place]\par , Minimally Invasive/Robotic Cancer Surgery, Central & Eastern Divisions\par Division of Surgical Oncology

## 2019-03-13 NOTE — HISTORY OF PRESENT ILLNESS
[de-identified] : 63 year-old male returns for postop care, status post wide excision of melanoma from the right chest wall with right axillary sentinel node biopsy on 2/11/19.  Final pathology was residual 1.5 mm melanoma with one negative right axillary sentinel lymph node and negative margins (T2aN0).  \par \par He saw his PCP regarding erythema and induration surrounding the incision with purulent drainage, and was prescribed a course of cefadroxil.  He followed up with my partner in the office on 2/26/19 at which point he described improvement in the erythema on antibiotics.  Evidently there was no fluctuance, underlying fluid collection or draining sinus on exam so he was advised to continue antibiotics and follow up with me today. \par \par Today he is feeling well.  He has completed antibiotics and the erythema around his incision has resolved.\par \par Previous pertinent history is as follows:\par \par He was initially seen in consultation on 11/28/18.  He noticed a darkly pigmented lesion involving his right chest/nipple region approximately 4 years ago which continued to increase in size.  He ultimately saw his dermatologist on 11/12/18 who biopsied a lesion on his forehead, and biopsied the lateral and medial aspect of the chest wall lesion with the following pathology.\par \par (1) Forehead: squamous cell carcinoma\par (2) Right chest: melanoma with extensive regression, 0.5 mm, no ulceration, mitotic rate < 1/mm^2\par (3) Right chest: melanoma with extensive regression, 0.55 mm, no ulceration, mitotic rate < 1/mm^2\par \par PET/CT on 12/12/18 showed no evidence of metastatic disease.\par \par He denies any prior history of melanoma.  His past medical history includes HTN, HLD, mild intermittent asthma, and herniated discs, and spondylolisthesis.  He has had multiple orthopedic surgeries in the past and denies any prior issues with anesthesia. \par \par \par Referring MD/Derm: Dr. Kenyon Hodges\par PCP: Dr. Venkatesh Kaye

## 2019-03-13 NOTE — PHYSICAL EXAM
[de-identified] : Right chest wall incision well-approximated with minimal surrounding erythema, no induration, underlying collection or drainage.

## 2019-04-02 ENCOUNTER — APPOINTMENT (OUTPATIENT)
Dept: FAMILY MEDICINE | Facility: CLINIC | Age: 64
End: 2019-04-02
Payer: MEDICARE

## 2019-04-02 VITALS
WEIGHT: 208 LBS | DIASTOLIC BLOOD PRESSURE: 70 MMHG | SYSTOLIC BLOOD PRESSURE: 120 MMHG | BODY MASS INDEX: 31.52 KG/M2 | OXYGEN SATURATION: 99 % | HEIGHT: 68 IN | RESPIRATION RATE: 14 BRPM | HEART RATE: 76 BPM

## 2019-04-02 PROCEDURE — 99213 OFFICE O/P EST LOW 20 MIN: CPT

## 2019-04-02 RX ORDER — PREGABALIN 50 MG/1
50 CAPSULE ORAL
Qty: 120 | Refills: 0 | Status: DISCONTINUED | COMMUNITY
Start: 2018-05-08 | End: 2019-04-02

## 2019-04-02 NOTE — HISTORY OF PRESENT ILLNESS
[FreeTextEntry1] : pt presents for 3 month check and note for jury duty  [de-identified] : Pt here for treatment of neuropathy of his hands. Recheck of HTN.

## 2019-04-02 NOTE — ASSESSMENT
[FreeTextEntry1] : 1. HTN- BP is well controlled on tx- 120/70- meds are UTD.  2. Neuropathy of hands - rx for lyrica 100 mg  sent in - Use/precautions/safety of medication all reviewed.  [place]   Chest surgery for melanoma is healing well \par

## 2019-04-02 NOTE — REVIEW OF SYSTEMS
[Joint Pain] : joint pain [Joint Stiffness] : joint stiffness [Negative] : Heme/Lymph [Muscle Pain] : muscle pain [Back Pain] : back pain [de-identified] : incision healing well [de-identified] : neuropathy of arms

## 2019-04-02 NOTE — PHYSICAL EXAM
[No Acute Distress] : no acute distress [Well Nourished] : well nourished [Well Developed] : well developed [Well-Appearing] : well-appearing [No JVD] : no jugular venous distention [Supple] : supple [No Lymphadenopathy] : no lymphadenopathy [No Respiratory Distress] : no respiratory distress  [Clear to Auscultation] : lungs were clear to auscultation bilaterally [Normal Rate] : normal rate  [Regular Rhythm] : with a regular rhythm [No Murmur] : no murmur heard [No Carotid Bruits] : no carotid bruits [Normal Posterior Cervical Nodes] : no posterior cervical lymphadenopathy [Normal Anterior Cervical Nodes] : no anterior cervical lymphadenopathy [de-identified] : tender in lumbar spine, painful limited ROM [de-identified] : pain in hands

## 2019-06-06 ENCOUNTER — RX RENEWAL (OUTPATIENT)
Age: 64
End: 2019-06-06

## 2019-07-02 ENCOUNTER — APPOINTMENT (OUTPATIENT)
Dept: FAMILY MEDICINE | Facility: CLINIC | Age: 64
End: 2019-07-02
Payer: MEDICARE

## 2019-07-02 ENCOUNTER — NON-APPOINTMENT (OUTPATIENT)
Age: 64
End: 2019-07-02

## 2019-07-02 ENCOUNTER — RX RENEWAL (OUTPATIENT)
Age: 64
End: 2019-07-02

## 2019-07-02 VITALS
DIASTOLIC BLOOD PRESSURE: 80 MMHG | OXYGEN SATURATION: 96 % | SYSTOLIC BLOOD PRESSURE: 130 MMHG | RESPIRATION RATE: 14 BRPM | HEIGHT: 68 IN | WEIGHT: 212 LBS | HEART RATE: 82 BPM | BODY MASS INDEX: 32.13 KG/M2

## 2019-07-02 DIAGNOSIS — Z87.09 PERSONAL HISTORY OF OTHER DISEASES OF THE RESPIRATORY SYSTEM: ICD-10-CM

## 2019-07-02 DIAGNOSIS — S29.011A STRAIN OF MUSCLE AND TENDON OF FRONT WALL OF THORAX, INITIAL ENCOUNTER: ICD-10-CM

## 2019-07-02 DIAGNOSIS — R07.9 CHEST PAIN, UNSPECIFIED: ICD-10-CM

## 2019-07-02 PROCEDURE — 93000 ELECTROCARDIOGRAM COMPLETE: CPT

## 2019-07-02 PROCEDURE — 99214 OFFICE O/P EST MOD 30 MIN: CPT | Mod: 25

## 2019-07-02 RX ORDER — DICLOFENAC POTASSIUM 50 MG/1
50 TABLET, COATED ORAL
Qty: 180 | Refills: 1 | Status: DISCONTINUED | COMMUNITY
Start: 2019-06-06 | End: 2019-07-02

## 2019-07-02 RX ORDER — CEFADROXIL 500 MG/1
500 CAPSULE ORAL TWICE DAILY
Qty: 20 | Refills: 0 | Status: DISCONTINUED | COMMUNITY
Start: 2019-02-25 | End: 2019-07-02

## 2019-07-02 NOTE — HISTORY OF PRESENT ILLNESS
[FreeTextEntry1] : Patient present for 3 month follow up \par \par \par  [de-identified] : Here for management of HTN,  allergic rhinitis, hyperlipidemia  and arthritis. Also needs treatment of anxiety.  Also having some left chest pain - started after doing things around the house. Sharp pain at times.

## 2019-07-02 NOTE — REVIEW OF SYSTEMS
[Postnasal Drip] : postnasal drip [Nasal Discharge] : nasal discharge [Chest Pain] : chest pain [Joint Pain] : joint pain [Joint Stiffness] : joint stiffness [Back Pain] : back pain [Anxiety] : anxiety [Negative] : Psychiatric

## 2019-07-02 NOTE — PHYSICAL EXAM
[No Acute Distress] : no acute distress [Well Nourished] : well nourished [Well Developed] : well developed [Well-Appearing] : well-appearing [Normal Outer Ear/Nose] : the outer ears and nose were normal in appearance [Normal TMs] : both tympanic membranes were normal [No JVD] : no jugular venous distention [No Lymphadenopathy] : no lymphadenopathy [Supple] : supple [No Respiratory Distress] : no respiratory distress  [No Accessory Muscle Use] : no accessory muscle use [Normal Rate] : normal rate  [Regular Rhythm] : with a regular rhythm [No Murmur] : no murmur heard [No Carotid Bruits] : no carotid bruits [Normal Anterior Cervical Nodes] : no anterior cervical lymphadenopathy [Normal Posterior Cervical Nodes] : no posterior cervical lymphadenopathy [Memory Grossly Normal] : memory grossly normal [Speech Grossly Normal] : speech grossly normal [Alert and Oriented x3] : oriented to person, place, and time [Normal Insight/Judgement] : insight and judgment were intact [de-identified] : watery PND [de-identified] : tender in lumbar spine, pain with ROM [de-identified] : numerous skin changes [de-identified] : anxious [de-identified] : left chest tender to palpation, increased pain with a deep breath

## 2019-07-02 NOTE — ASSESSMENT
[FreeTextEntry1] : 1. HTN- BP was well controlled 130/80- and I renewed his  losartan 100 mg. 2. He is doing well with xyzal 5 mg and  flonase NS.  3. Diet and use of  simvastatin reviewed and it was renewed.  4. For his  ongoing arthritis pain rx for  diclofenac 75 mg  1 bid pc as  cataflam  50 mg  not covered.  5. I renewed his alprazolam  0.5 mg for his anxiety- Use/precautions/safety of medication all reviewed.  [place]   6. and 7.  EKG - no acute changes . Exam and history consistent with muscular and he will use the diclofenac and tizanidine, as well as heat to the area. \par

## 2019-07-04 ENCOUNTER — MOBILE ON CALL (OUTPATIENT)
Age: 64
End: 2019-07-04

## 2019-07-05 ENCOUNTER — RX RENEWAL (OUTPATIENT)
Age: 64
End: 2019-07-05

## 2019-07-09 ENCOUNTER — RX RENEWAL (OUTPATIENT)
Age: 64
End: 2019-07-09

## 2019-07-24 ENCOUNTER — APPOINTMENT (OUTPATIENT)
Dept: SURGICAL ONCOLOGY | Facility: CLINIC | Age: 64
End: 2019-07-24
Payer: MEDICARE

## 2019-07-24 VITALS
WEIGHT: 215 LBS | BODY MASS INDEX: 32.58 KG/M2 | DIASTOLIC BLOOD PRESSURE: 88 MMHG | HEIGHT: 68 IN | HEART RATE: 66 BPM | SYSTOLIC BLOOD PRESSURE: 157 MMHG

## 2019-07-24 PROCEDURE — 99215 OFFICE O/P EST HI 40 MIN: CPT

## 2019-07-24 NOTE — HISTORY OF PRESENT ILLNESS
[de-identified] : 63 year-old male returns for follow up.  He is status post wide excision of melanoma from the right chest wall with right axillary sentinel node biopsy on 2/11/19.  Final pathology was residual 1.5 mm melanoma with one negative right axillary sentinel lymph node and negative margins (T2aN0).  \par \par Incisions have healed well at this point and he continues dermatology follow ups every 3 months.  He denies any recent biopsies but has undergone cryotherapy.\par \par He remains under the care of Dr. Marzena Beck from medical oncology.  He underwent a PET/CT in June 2019 which revealed minimall prominent non-FDG avid lymph node, possibly reactive.  He will undergo a repeat PET/CT in approximately October 2019.\par \par Previous pertinent history is as follows:\par \par He was initially seen in consultation on 11/28/18.  He noticed a darkly pigmented lesion involving his right chest/nipple region approximately 4 years ago which continued to increase in size.  He ultimately saw his dermatologist on 11/12/18 who biopsied a lesion on his forehead, and biopsied the lateral and medial aspect of the chest wall lesion with the following pathology.\par \par (1) Forehead: squamous cell carcinoma\par (2) Right chest: melanoma with extensive regression, 0.5 mm, no ulceration, mitotic rate < 1/mm^2\par (3) Right chest: melanoma with extensive regression, 0.55 mm, no ulceration, mitotic rate < 1/mm^2\par \par PET/CT on 12/12/18 showed no evidence of metastatic disease.\par \par He denies any prior history of melanoma.  His past medical history includes HTN, HLD, mild intermittent asthma, and herniated discs, and spondylolisthesis.  He has had multiple orthopedic surgeries in the past and denies any prior issues with anesthesia. \par \par Referring MD/Derm: Dr. Kenyon Hodges\par PCP: Dr. Venkatesh Kaye

## 2019-07-24 NOTE — ASSESSMENT
[FreeTextEntry1] : Mildly prominent right axillary lymph node on PET/CT.\par Patient to have repeat PET/CT approximately October 2019.\par Follow up with Dr. Beck.

## 2019-07-24 NOTE — CONSULT LETTER
[Dear  ___] : Dear  [unfilled], [Consult Letter:] : I had the pleasure of evaluating your patient, [unfilled]. [Consult Closing:] : Thank you very much for allowing me to participate in the care of this patient.  If you have any questions, please do not hesitate to contact me. [Sincerely,] : Sincerely, [DrJessie  ___] : Dr. FLANAGAN [FreeTextEntry1] : 63 year-old male returns for follow up.  He is status post wide excision of melanoma from the right chest wall with right axillary sentinel node biopsy on 2/11/19.  Final pathology was residual 1.5 mm melanoma with one negative right axillary sentinel lymph node and negative margins (T2aN0).  \par \par Incisions have healed well at this point and he continues dermatology follow ups every 3 months.  He denies any recent biopsies but has undergone cryotherapy.\par \par He remains under the care of Dr. Marzena Beck from medical oncology.  He underwent a PET/CT in June 2019 which revealed minimall prominent non-FDG avid lymph node, possibly reactive.  He will undergo a repeat PET/CT in approximately October 2019.\par \par Previous pertinent history is as follows:\par \par He was initially seen in consultation on 11/28/18.  He noticed a darkly pigmented lesion involving his right chest/nipple region approximately 4 years ago which continued to increase in size.  He ultimately saw his dermatologist on 11/12/18 who biopsied a lesion on his forehead, and biopsied the lateral and medial aspect of the chest wall lesion with the following pathology.\par \par (1) Forehead: squamous cell carcinoma\par (2) Right chest: melanoma with extensive regression, 0.5 mm, no ulceration, mitotic rate < 1/mm^2\par (3) Right chest: melanoma with extensive regression, 0.55 mm, no ulceration, mitotic rate < 1/mm^2\par \par PET/CT on 12/12/18 showed no evidence of metastatic disease.\par \par He denies any prior history of melanoma.  His past medical history includes HTN, HLD, mild intermittent asthma, and herniated discs, and spondylolisthesis.  He has had multiple orthopedic surgeries in the past and denies any prior issues with anesthesia. \par \par A&P:\par Mildly prominent right axillary lymph node on PET/CT.\par Patient to have repeat PET/CT approximately October 2019.\par Follow up with Dr. Beck.\par \par Referring MD/Derm: Dr. Kenyon Hodges\par PCP: Dr. Venkatesh Kaye [FreeTextEntry2] : Kenyon Hodges MD [FreeTextEntry3] : Venkatesh Johnson MD, FACS, FASCRS\par , Department of Surgery\par Director of the [facility] [place]\par , Minimally Invasive/Robotic Cancer Surgery, Central & Eastern Divisions\par Division of Surgical Oncology

## 2019-09-24 ENCOUNTER — RX RENEWAL (OUTPATIENT)
Age: 64
End: 2019-09-24

## 2019-10-03 ENCOUNTER — APPOINTMENT (OUTPATIENT)
Dept: FAMILY MEDICINE | Facility: CLINIC | Age: 64
End: 2019-10-03
Payer: MEDICARE

## 2019-10-03 VITALS
SYSTOLIC BLOOD PRESSURE: 140 MMHG | BODY MASS INDEX: 32.28 KG/M2 | HEART RATE: 78 BPM | WEIGHT: 213 LBS | HEIGHT: 68 IN | DIASTOLIC BLOOD PRESSURE: 90 MMHG | OXYGEN SATURATION: 97 % | RESPIRATION RATE: 12 BRPM

## 2019-10-03 VITALS — DIASTOLIC BLOOD PRESSURE: 82 MMHG | SYSTOLIC BLOOD PRESSURE: 132 MMHG

## 2019-10-03 PROCEDURE — 99213 OFFICE O/P EST LOW 20 MIN: CPT

## 2019-10-03 NOTE — PHYSICAL EXAM
[No Acute Distress] : no acute distress [Well Nourished] : well nourished [Well Developed] : well developed [Well-Appearing] : well-appearing [Normal Outer Ear/Nose] : the outer ears and nose were normal in appearance [Normal TMs] : both tympanic membranes were normal [No JVD] : no jugular venous distention [No Lymphadenopathy] : no lymphadenopathy [Supple] : supple [No Respiratory Distress] : no respiratory distress  [No Accessory Muscle Use] : no accessory muscle use [Normal Rate] : normal rate  [Regular Rhythm] : with a regular rhythm [No Murmur] : no murmur heard [No Carotid Bruits] : no carotid bruits [Normal Posterior Cervical Nodes] : no posterior cervical lymphadenopathy [Normal Anterior Cervical Nodes] : no anterior cervical lymphadenopathy [Speech Grossly Normal] : speech grossly normal [Memory Grossly Normal] : memory grossly normal [Alert and Oriented x3] : oriented to person, place, and time [Normal Insight/Judgement] : insight and judgment were intact [de-identified] : watery PND [de-identified] : tender in lumbar spine, pain with ROM [de-identified] : left chest tender to palpation, increased pain with a deep breath  [de-identified] : numerous skin changes [de-identified] : anxious

## 2019-10-03 NOTE — REVIEW OF SYSTEMS
[Postnasal Drip] : postnasal drip [Nasal Discharge] : nasal discharge [Chest Pain] : chest pain [Joint Pain] : joint pain [Joint Stiffness] : joint stiffness [Back Pain] : back pain [Insomnia] : insomnia [Anxiety] : anxiety [Negative] : Heme/Lymph

## 2019-10-03 NOTE — HISTORY OF PRESENT ILLNESS
[FreeTextEntry1] : pt presents for 3 month check \par pt c/o pnd +cough x 3 weeks  [de-identified] : He doesn't do flu/pneumonia shots.  Her for management of HTN and anxiety.  He uses his xanax 1 to 2 times a day- usually just in the evening to control racing of thoughts.

## 2019-10-16 ENCOUNTER — APPOINTMENT (OUTPATIENT)
Dept: SURGICAL ONCOLOGY | Facility: CLINIC | Age: 64
End: 2019-10-16

## 2019-11-22 ENCOUNTER — RX RENEWAL (OUTPATIENT)
Age: 64
End: 2019-11-22

## 2019-12-20 ENCOUNTER — APPOINTMENT (OUTPATIENT)
Dept: FAMILY MEDICINE | Facility: CLINIC | Age: 64
End: 2019-12-20
Payer: MEDICARE

## 2019-12-20 VITALS
OXYGEN SATURATION: 98 % | BODY MASS INDEX: 32.28 KG/M2 | SYSTOLIC BLOOD PRESSURE: 130 MMHG | HEART RATE: 78 BPM | HEIGHT: 68 IN | DIASTOLIC BLOOD PRESSURE: 78 MMHG | RESPIRATION RATE: 14 BRPM | WEIGHT: 213 LBS

## 2019-12-20 DIAGNOSIS — S46.212A STRAIN OF MUSCLE, FASCIA AND TENDON OF OTHER PARTS OF BICEPS, LEFT ARM, INITIAL ENCOUNTER: ICD-10-CM

## 2019-12-20 PROCEDURE — 99213 OFFICE O/P EST LOW 20 MIN: CPT

## 2019-12-20 NOTE — HISTORY OF PRESENT ILLNESS
[FreeTextEntry8] : pt c/o back pain and neck   He is having an issue with his left upper arm- biceps -  decrease in strength ,  very painful ROM .  He is having  increased pain in his neck ,  painful ROM, limited ROM.  Also needs treatment of anxiety.

## 2019-12-20 NOTE — ASSESSMENT
[FreeTextEntry1] : 1. and 2.  Rx's for MRI's of the areas without contrast.   3. and 4.  Rx for alprazolam  # 50  - he doesn't use it daily-  Use/precautions/safety of medication all reviewed.  [facility]  checked  [id number]  [facility] done   Safe use stressed.   No evidence of suicidal  ideation. \par

## 2019-12-20 NOTE — PHYSICAL EXAM
[No Acute Distress] : no acute distress [Well Nourished] : well nourished [Well Developed] : well developed [Well-Appearing] : well-appearing [Normal Outer Ear/Nose] : the outer ears and nose were normal in appearance [Normal TMs] : both tympanic membranes were normal [No Lymphadenopathy] : no lymphadenopathy [No JVD] : no jugular venous distention [No Respiratory Distress] : no respiratory distress  [No Accessory Muscle Use] : no accessory muscle use [No Murmur] : no murmur heard [Normal Rate] : normal rate  [Regular Rhythm] : with a regular rhythm [Normal Posterior Cervical Nodes] : no posterior cervical lymphadenopathy [No Carotid Bruits] : no carotid bruits [Normal Anterior Cervical Nodes] : no anterior cervical lymphadenopathy [Speech Grossly Normal] : speech grossly normal [Memory Grossly Normal] : memory grossly normal [Alert and Oriented x3] : oriented to person, place, and time [Normal Insight/Judgement] : insight and judgment were intact [de-identified] : tender in lumbar spine, pain with ROM [de-identified] : tender in left biceps,  painful  ROM,  decreased strength [de-identified] : numerous skin changes [de-identified] : painful limited ROM,  tender posterior  paravertebral  muscles [de-identified] : anxious

## 2019-12-20 NOTE — REVIEW OF SYSTEMS
[Postnasal Drip] : no postnasal drip [Nasal Discharge] : no nasal discharge [Chest Pain] : no chest pain [Joint Stiffness] : joint stiffness [Joint Pain] : joint pain [Insomnia] : insomnia [Anxiety] : anxiety [Back Pain] : back pain [FreeTextEntry9] : neck pain,  left upper arm pain [Negative] : Heme/Lymph

## 2019-12-28 ENCOUNTER — RX RENEWAL (OUTPATIENT)
Age: 64
End: 2019-12-28

## 2020-01-06 ENCOUNTER — APPOINTMENT (OUTPATIENT)
Dept: FAMILY MEDICINE | Facility: CLINIC | Age: 65
End: 2020-01-06

## 2020-01-07 ENCOUNTER — RX RENEWAL (OUTPATIENT)
Age: 65
End: 2020-01-07

## 2020-03-20 ENCOUNTER — APPOINTMENT (OUTPATIENT)
Dept: FAMILY MEDICINE | Facility: CLINIC | Age: 65
End: 2020-03-20

## 2020-03-21 ENCOUNTER — RX RENEWAL (OUTPATIENT)
Age: 65
End: 2020-03-21

## 2020-04-01 PROBLEM — Z85.820 HISTORY OF MALIGNANT MELANOMA: Status: RESOLVED | Noted: 2018-11-27 | Resolved: 2020-04-01

## 2020-04-01 PROBLEM — C43.9 MELANOMA: Status: ACTIVE | Noted: 2020-04-01

## 2020-04-01 NOTE — ADDENDUM
[FreeTextEntry1] : I, Bernard Recinos, acted solely as a scribe for Dr. Jarad Moreau on this date 1/15/19.

## 2020-04-01 NOTE — HISTORY OF PRESENT ILLNESS
[FreeTextEntry1] : 64 y/o male presents for initial consultation for melanoma of right areolar complex\par He is scheduled for right chest nipple melanoma wide excision with right axillary sentinel nodes biopsy with Dr. Venkatesh Johnson on 2/11/18. \par \par He states that he does not desire nipple reconstruction.\par S/p back surgery last week. \par Patient is currently on Diclofenac.

## 2020-04-01 NOTE — PHYSICAL EXAM
[NI] : Normal [de-identified] : Brown pigmented lesion contained within right areola, 50% of right areola.

## 2020-04-27 ENCOUNTER — APPOINTMENT (OUTPATIENT)
Dept: FAMILY MEDICINE | Facility: CLINIC | Age: 65
End: 2020-04-27
Payer: MEDICARE

## 2020-04-27 VITALS
WEIGHT: 220 LBS | DIASTOLIC BLOOD PRESSURE: 80 MMHG | SYSTOLIC BLOOD PRESSURE: 130 MMHG | HEART RATE: 80 BPM | BODY MASS INDEX: 33.34 KG/M2 | HEIGHT: 68 IN | RESPIRATION RATE: 14 BRPM | OXYGEN SATURATION: 98 %

## 2020-04-27 DIAGNOSIS — Z23 ENCOUNTER FOR IMMUNIZATION: ICD-10-CM

## 2020-04-27 PROCEDURE — G0008: CPT

## 2020-04-27 PROCEDURE — 90653 IIV ADJUVANT VACCINE IM: CPT

## 2020-04-27 PROCEDURE — 90715 TDAP VACCINE 7 YRS/> IM: CPT | Mod: GY

## 2020-04-27 PROCEDURE — 90472 IMMUNIZATION ADMIN EACH ADD: CPT

## 2020-04-27 PROCEDURE — 99213 OFFICE O/P EST LOW 20 MIN: CPT | Mod: 25

## 2020-04-27 NOTE — PHYSICAL EXAM
[No Acute Distress] : no acute distress [Well Nourished] : well nourished [Well Developed] : well developed [Well-Appearing] : well-appearing [Normal TMs] : both tympanic membranes were normal [Normal Outer Ear/Nose] : the outer ears and nose were normal in appearance [No JVD] : no jugular venous distention [No Lymphadenopathy] : no lymphadenopathy [No Respiratory Distress] : no respiratory distress  [No Accessory Muscle Use] : no accessory muscle use [Normal Rate] : normal rate  [Regular Rhythm] : with a regular rhythm [No Murmur] : no murmur heard [No Carotid Bruits] : no carotid bruits [Normal Posterior Cervical Nodes] : no posterior cervical lymphadenopathy [Normal Anterior Cervical Nodes] : no anterior cervical lymphadenopathy [Memory Grossly Normal] : memory grossly normal [Speech Grossly Normal] : speech grossly normal [Normal Insight/Judgement] : insight and judgment were intact [Alert and Oriented x3] : oriented to person, place, and time [de-identified] : tender in lumbar spine, pain with ROM [de-identified] : painful limited ROM,  tender posterior  paravertebral  muscles [de-identified] : tender in left biceps,  painful  ROM,  decreased strength [de-identified] : anxious [de-identified] : numerous skin changes

## 2020-04-27 NOTE — HISTORY OF PRESENT ILLNESS
[FreeTextEntry1] : pt presents for med check  [de-identified] : Here for treatment of anxiety , HTN,  TdaP and a flu shot.

## 2020-04-27 NOTE — REVIEW OF SYSTEMS
[Joint Pain] : joint pain [Joint Stiffness] : joint stiffness [Back Pain] : back pain [Insomnia] : insomnia [Anxiety] : anxiety [Negative] : Psychiatric [Nasal Discharge] : no nasal discharge [Postnasal Drip] : no postnasal drip [Chest Pain] : no chest pain [FreeTextEntry9] : neck pain,  left upper arm pain

## 2020-04-27 NOTE — ASSESSMENT
[FreeTextEntry1] : 1. HTN- BP is controlled with tx-  130/80- and tx is UTD.  Diet/ activity/ fall and COVID19 precautions reviewed.  2.  I renewed his alprazolam -  Use/precautions/safety of medication all reviewed.  [place]  checked [id number]   RRC done   SAfe use stressed   3. and 4  Both were  given\par

## 2020-05-14 ENCOUNTER — RX RENEWAL (OUTPATIENT)
Age: 65
End: 2020-05-14

## 2020-06-18 ENCOUNTER — RX RENEWAL (OUTPATIENT)
Age: 65
End: 2020-06-18

## 2020-07-02 ENCOUNTER — RX RENEWAL (OUTPATIENT)
Age: 65
End: 2020-07-02

## 2020-07-10 ENCOUNTER — APPOINTMENT (OUTPATIENT)
Dept: FAMILY MEDICINE | Facility: CLINIC | Age: 65
End: 2020-07-10

## 2020-07-27 ENCOUNTER — APPOINTMENT (OUTPATIENT)
Dept: FAMILY MEDICINE | Facility: CLINIC | Age: 65
End: 2020-07-27
Payer: MEDICARE

## 2020-07-27 VITALS
SYSTOLIC BLOOD PRESSURE: 140 MMHG | OXYGEN SATURATION: 98 % | HEART RATE: 85 BPM | BODY MASS INDEX: 33.34 KG/M2 | DIASTOLIC BLOOD PRESSURE: 80 MMHG | TEMPERATURE: 97.7 F | WEIGHT: 220 LBS | RESPIRATION RATE: 14 BRPM | HEIGHT: 68 IN

## 2020-07-27 VITALS — TEMPERATURE: 97.7 F

## 2020-07-27 DIAGNOSIS — L02.419 CUTANEOUS ABSCESS OF LIMB, UNSPECIFIED: ICD-10-CM

## 2020-07-27 PROCEDURE — 99213 OFFICE O/P EST LOW 20 MIN: CPT

## 2020-07-27 RX ORDER — ALPRAZOLAM 0.5 MG/1
0.5 TABLET ORAL
Qty: 50 | Refills: 0 | Status: ACTIVE | COMMUNITY
Start: 2018-05-11 | End: 1900-01-01

## 2020-07-27 NOTE — HISTORY OF PRESENT ILLNESS
[FreeTextEntry1] : pt presents for a f/u on paper work for parking permit \par pt c/o of boil on right inner thigh x 1 month  [de-identified] : Has had opening in right thigh for several days  Also needs management of anxiety and  HTN.

## 2020-07-27 NOTE — PHYSICAL EXAM
[Well Nourished] : well nourished [No Acute Distress] : no acute distress [Well Developed] : well developed [Well-Appearing] : well-appearing [Normal Outer Ear/Nose] : the outer ears and nose were normal in appearance [Normal TMs] : both tympanic membranes were normal [No JVD] : no jugular venous distention [No Lymphadenopathy] : no lymphadenopathy [No Respiratory Distress] : no respiratory distress  [No Accessory Muscle Use] : no accessory muscle use [Normal Rate] : normal rate  [Regular Rhythm] : with a regular rhythm [No Murmur] : no murmur heard [No Carotid Bruits] : no carotid bruits [Normal Posterior Cervical Nodes] : no posterior cervical lymphadenopathy [Normal Anterior Cervical Nodes] : no anterior cervical lymphadenopathy [Speech Grossly Normal] : speech grossly normal [Memory Grossly Normal] : memory grossly normal [Alert and Oriented x3] : oriented to person, place, and time [Normal Insight/Judgement] : insight and judgment were intact [de-identified] : painful limited ROM,  tender posterior  paravertebral  muscles [de-identified] : tender in lumbar spine, pain with ROM [de-identified] : numerous skin changes,  opening of abscess right medial proximal thigh [de-identified] : tender in left biceps,  painful  ROM,  decreased strength [de-identified] : anxious

## 2020-07-27 NOTE — REVIEW OF SYSTEMS
[Postnasal Drip] : no postnasal drip [Nasal Discharge] : no nasal discharge [Chest Pain] : no chest pain [Joint Pain] : joint pain [Joint Stiffness] : joint stiffness [Back Pain] : back pain [Insomnia] : insomnia [Anxiety] : anxiety [Negative] : Heme/Lymph [de-identified] : abscess right thigh [FreeTextEntry9] : neck pain,  left upper arm pain

## 2020-07-27 NOTE — ASSESSMENT
[FreeTextEntry1] : 1. HTN- BP is stable - 140/80- Reviewed measures to lower BP as well as fall and COVID19 precautions.  2.  Wound care reviewed and rx for cefadroxil 500 mg bid.  3.  I renewed his alprazolam - safe use and storage stressed.  Use/precautions/safety of medication all reviewed.  [facility]  checked   [id number]  RRC done  .  Stressed need to see Derm on a regular basis. \par

## 2020-09-18 ENCOUNTER — RX RENEWAL (OUTPATIENT)
Age: 65
End: 2020-09-18

## 2020-09-30 ENCOUNTER — RX RENEWAL (OUTPATIENT)
Age: 65
End: 2020-09-30

## 2020-10-27 ENCOUNTER — APPOINTMENT (OUTPATIENT)
Dept: FAMILY MEDICINE | Facility: CLINIC | Age: 65
End: 2020-10-27

## 2020-10-30 ENCOUNTER — APPOINTMENT (OUTPATIENT)
Dept: FAMILY MEDICINE | Facility: CLINIC | Age: 65
End: 2020-10-30
Payer: MEDICARE

## 2020-10-30 VITALS
TEMPERATURE: 98.3 F | DIASTOLIC BLOOD PRESSURE: 80 MMHG | HEART RATE: 82 BPM | BODY MASS INDEX: 32.89 KG/M2 | OXYGEN SATURATION: 98 % | SYSTOLIC BLOOD PRESSURE: 150 MMHG | WEIGHT: 217 LBS | RESPIRATION RATE: 14 BRPM | HEIGHT: 68 IN

## 2020-10-30 VITALS — SYSTOLIC BLOOD PRESSURE: 152 MMHG | DIASTOLIC BLOOD PRESSURE: 90 MMHG

## 2020-10-30 DIAGNOSIS — J30.9 ALLERGIC RHINITIS, UNSPECIFIED: ICD-10-CM

## 2020-10-30 DIAGNOSIS — G47.00 INSOMNIA, UNSPECIFIED: ICD-10-CM

## 2020-10-30 PROCEDURE — 99213 OFFICE O/P EST LOW 20 MIN: CPT

## 2020-10-30 RX ORDER — ESZOPICLONE 1 MG/1
1 TABLET, FILM COATED ORAL
Qty: 30 | Refills: 0 | Status: ACTIVE | COMMUNITY
Start: 2020-10-30 | End: 1900-01-01

## 2020-10-30 RX ORDER — METHOCARBAMOL 500 MG/1
500 TABLET, FILM COATED ORAL
Qty: 60 | Refills: 0 | Status: DISCONTINUED | COMMUNITY
Start: 2019-01-11 | End: 2020-10-30

## 2020-10-30 RX ORDER — TIZANIDINE 4 MG/1
4 TABLET ORAL
Qty: 90 | Refills: 2 | Status: DISCONTINUED | COMMUNITY
Start: 2019-07-02 | End: 2020-10-30

## 2020-10-30 NOTE — HISTORY OF PRESENT ILLNESS
[FreeTextEntry1] : Patient present for medication renewal\par  [de-identified] : 63 yo male, pmh of htn, HLD, anxiety, chronic back pain seeing pain management presents today for follow up. Says that he has been seeing spine specialist and on tramodol for back pain. Is due to get injections and if fails will likely need surgery. States he had coffee just prior to coming into office and he usually has white coat HTN. Does not have much salt in his diet. Also requests refills of meds today because of insomnia. Has been on buspirone for anxiety for many years and uses xanax as a sleep aid vs use for anxiety. No other complaints today.

## 2020-10-30 NOTE — PHYSICAL EXAM
[No Lymphadenopathy] : no lymphadenopathy [Supple] : supple [Normal] : normal rate, regular rhythm, normal S1 and S2 and no murmur heard [Coordination Grossly Intact] : coordination grossly intact [Normal Affect] : the affect was normal [Normal Insight/Judgement] : insight and judgment were intact [de-identified] : gait limited secondary to chronic back pain.

## 2020-10-30 NOTE — PLAN
[FreeTextEntry1] : Anxiety \par Istop checked Reference #: [id number] \par patient currently on buspirone for control of anxiety\par Not having any acute episodes of panic disorder, or racing thoughts at night time. \par Denies SI/HI\par \par Insomnia\par Was previously taking xanax PRN QHS for sleep aid purposes\par I discussed that when taking opiod and mixing with benzo, there is a risk of respiratory depression so we will switching him to non-benzo sleep aid and trial that as alternative agent. \par Patient agrees and he does not take xanax nightly\par will trial lunesta for 30 days and return to assess for improvement\par pain contract signed, urine left. \par \par HTN\par noted elevated on repeat\par had coffee prior to visit\par takes bp medications\par discussed low salt diet\par will have come back in 30 days, recheck, and told to not have caffeine\par will adjust meds on next visit if still elevated\par \par Intervetebral disc disorder\par renewed lyrica \par has symptoms of neuropathy \par has been tolerating well\par istop checked\par \par return for follow up in 30 days\par Patient agrees with plan, all further questions answered during encounter.\par \par \par

## 2020-11-06 LAB — COMPREHENSIVE SCREEN URINE: NORMAL

## 2020-12-11 ENCOUNTER — APPOINTMENT (OUTPATIENT)
Dept: FAMILY MEDICINE | Facility: CLINIC | Age: 65
End: 2020-12-11
Payer: MEDICARE

## 2020-12-11 VITALS
TEMPERATURE: 98.1 F | RESPIRATION RATE: 14 BRPM | SYSTOLIC BLOOD PRESSURE: 150 MMHG | OXYGEN SATURATION: 97 % | HEIGHT: 68 IN | BODY MASS INDEX: 32.4 KG/M2 | DIASTOLIC BLOOD PRESSURE: 86 MMHG | HEART RATE: 82 BPM | WEIGHT: 213.8 LBS

## 2020-12-11 VITALS — SYSTOLIC BLOOD PRESSURE: 150 MMHG | DIASTOLIC BLOOD PRESSURE: 70 MMHG

## 2020-12-11 DIAGNOSIS — K08.89 OTHER SPECIFIED DISORDERS OF TEETH AND SUPPORTING STRUCTURES: ICD-10-CM

## 2020-12-11 DIAGNOSIS — Z12.11 ENCOUNTER FOR SCREENING FOR MALIGNANT NEOPLASM OF COLON: ICD-10-CM

## 2020-12-11 PROCEDURE — 99213 OFFICE O/P EST LOW 20 MIN: CPT | Mod: 25

## 2020-12-11 NOTE — PLAN
[FreeTextEntry1] : HTN\par currently taking losartan 50 daily\par not currently at goal \par patient does endorse coffee intake as well as pain from tooth that is loose \par being that this was second visit within 3 months where systolic above 150, will start on norvasc 5mg as well\par follow up in 3 weeks for BP check\par checking cbc, cmp\par \par Neuropathy, Arm\par istop Reference #: [id number] \par patient with some relief obtained with lyrica\par will continue with Rx at this time\par is due to follow up with ortho for further workup \par \par Colon cancer screening\par iFOBT kit ordered for patient \par \par Loose Tooth\par due to follow up with dentist next week \par experiencing some pain and discomfort from this. \par \par Patient agrees with plan, all further questions answered during encounter.\par

## 2020-12-11 NOTE — PHYSICAL EXAM
[Coordination Grossly Intact] : coordination grossly intact [No Focal Deficits] : no focal deficits [Normal Gait] : normal gait [Normal] : affect was normal and insight and judgment were intact [Normal Outer Ear/Nose] : the outer ears and nose were normal in appearance [Normal Oropharynx] : the oropharynx was normal [de-identified] : loose tooth on lower set.  [de-identified] : 5/5 strength upper and lower extremities.  [de-identified] : neuropathic burning pain in hands and arms.

## 2020-12-11 NOTE — HISTORY OF PRESENT ILLNESS
[FreeTextEntry1] : Patient present for medication renewal\par  [de-identified] : 66 yo male pmh htn, HLD, anxiety, chronic back pain with neuropathic pain in arms who presents for follow up. He requests refill on lyrica as that is the only medication that is able to give some improvement in his neuropathic pain in arms. He was previously told that it would have improved with his carpal tunnel surgery but still did not. Still experiences neuropathic pain both of his arms. Is due to follow back up with ortho for evaluation of his back and cspine. No other complaints today. Did fail urine drug screen noted to have thc in specimen and patient did verbalize to me and is aware that he will no longer get narcotics and benzos Rx from this practice as per policy. \par \par  \par

## 2020-12-17 DIAGNOSIS — E87.5 HYPERKALEMIA: ICD-10-CM

## 2020-12-17 LAB
ALBUMIN SERPL ELPH-MCNC: 4.4 G/DL
ALP BLD-CCNC: 80 U/L
ALT SERPL-CCNC: 26 U/L
ANION GAP SERPL CALC-SCNC: 12 MMOL/L
AST SERPL-CCNC: 20 U/L
BASOPHILS # BLD AUTO: 0.04 K/UL
BASOPHILS NFR BLD AUTO: 0.3 %
BILIRUB SERPL-MCNC: 0.3 MG/DL
BUN SERPL-MCNC: 26 MG/DL
CALCIUM SERPL-MCNC: 9.5 MG/DL
CHLORIDE SERPL-SCNC: 104 MMOL/L
CO2 SERPL-SCNC: 22 MMOL/L
CREAT SERPL-MCNC: 1.17 MG/DL
EOSINOPHIL # BLD AUTO: 0.14 K/UL
EOSINOPHIL NFR BLD AUTO: 1.2 %
ESTIMATED AVERAGE GLUCOSE: 131 MG/DL
GLUCOSE SERPL-MCNC: 105 MG/DL
HBA1C MFR BLD HPLC: 6.2 %
HCT VFR BLD CALC: 49.7 %
HGB BLD-MCNC: 15.6 G/DL
IMM GRANULOCYTES NFR BLD AUTO: 0.5 %
LYMPHOCYTES # BLD AUTO: 3.55 K/UL
LYMPHOCYTES NFR BLD AUTO: 30.4 %
MAN DIFF?: NORMAL
MCHC RBC-ENTMCNC: 30.2 PG
MCHC RBC-ENTMCNC: 31.4 GM/DL
MCV RBC AUTO: 96.1 FL
MONOCYTES # BLD AUTO: 1 K/UL
MONOCYTES NFR BLD AUTO: 8.6 %
NEUTROPHILS # BLD AUTO: 6.9 K/UL
NEUTROPHILS NFR BLD AUTO: 59 %
PLATELET # BLD AUTO: 284 K/UL
POTASSIUM SERPL-SCNC: 5.7 MMOL/L
PROT SERPL-MCNC: 7.5 G/DL
RBC # BLD: 5.17 M/UL
RBC # FLD: 14 %
SODIUM SERPL-SCNC: 138 MMOL/L
WBC # FLD AUTO: 11.69 K/UL

## 2020-12-22 LAB — HEMOCCULT STL QL IA: POSITIVE

## 2020-12-29 ENCOUNTER — RX RENEWAL (OUTPATIENT)
Age: 65
End: 2020-12-29

## 2020-12-29 ENCOUNTER — APPOINTMENT (OUTPATIENT)
Dept: FAMILY MEDICINE | Facility: CLINIC | Age: 65
End: 2020-12-29
Payer: MEDICARE

## 2020-12-29 VITALS
WEIGHT: 210 LBS | RESPIRATION RATE: 14 BRPM | TEMPERATURE: 97 F | HEIGHT: 68 IN | HEART RATE: 81 BPM | OXYGEN SATURATION: 97 % | BODY MASS INDEX: 31.83 KG/M2 | SYSTOLIC BLOOD PRESSURE: 148 MMHG | DIASTOLIC BLOOD PRESSURE: 80 MMHG

## 2020-12-29 VITALS — DIASTOLIC BLOOD PRESSURE: 76 MMHG | SYSTOLIC BLOOD PRESSURE: 140 MMHG

## 2020-12-29 DIAGNOSIS — Z11.59 ENCOUNTER FOR SCREENING FOR OTHER VIRAL DISEASES: ICD-10-CM

## 2020-12-29 DIAGNOSIS — R19.5 OTHER FECAL ABNORMALITIES: ICD-10-CM

## 2020-12-29 PROCEDURE — 99213 OFFICE O/P EST LOW 20 MIN: CPT | Mod: CS

## 2020-12-29 NOTE — HISTORY OF PRESENT ILLNESS
[FreeTextEntry1] : patient presents for blood pressure check  [de-identified] : 64 yo male pmh of htn presents today for follow up. Has been taking bp new meds as directed. Has been avoiding salt and trying to modify diet as discussed. Also seeing gi soon for abnormal fit kit. No other complaints today.

## 2020-12-29 NOTE — PHYSICAL EXAM
[No Edema] : there was no peripheral edema [Normal] : affect was normal and insight and judgment were intact [de-identified] : no calf tenderness

## 2020-12-29 NOTE — PLAN
[FreeTextEntry1] : HTN\par still above goal\par will uptitrate norvasc to 10mg today\par advised patient to keep bp log to compare bps at home with in office to r/o white coat htn\par checking cbc, cmp\par losartan renewed \par given ER precautions should he have any chest pain, headaches, blurry vision, new neuro deficits. \par \par HLD\par on statin\par checking CK levels to eval for statin myopathy \par checking fasting lipids\par \par Screen for Hep C\par checking hep C screen\par \par Abnormal stool test\par abnormal fit kit study renewed\par patient given gi referral to eval for colon ca\par patient says that he has his own gi and will schedule in january \par \par f/u in 3-4 weeks for repeat BP check \par Patient agrees with plan, all further questions answered during encounter.\par

## 2020-12-31 LAB
ALBUMIN SERPL ELPH-MCNC: 4.8 G/DL
ALP BLD-CCNC: 86 U/L
ALT SERPL-CCNC: 28 U/L
ANION GAP SERPL CALC-SCNC: 19 MMOL/L
AST SERPL-CCNC: 23 U/L
BASOPHILS # BLD AUTO: 0.05 K/UL
BASOPHILS NFR BLD AUTO: 0.4 %
BILIRUB SERPL-MCNC: 0.4 MG/DL
BUN SERPL-MCNC: 24 MG/DL
CALCIUM SERPL-MCNC: 9.9 MG/DL
CHLORIDE SERPL-SCNC: 105 MMOL/L
CHOLEST SERPL-MCNC: 211 MG/DL
CK SERPL-CCNC: 71 U/L
CO2 SERPL-SCNC: 16 MMOL/L
CREAT SERPL-MCNC: 0.96 MG/DL
EOSINOPHIL # BLD AUTO: 0.21 K/UL
EOSINOPHIL NFR BLD AUTO: 1.5 %
ESTIMATED AVERAGE GLUCOSE: 131 MG/DL
GLUCOSE SERPL-MCNC: 97 MG/DL
HBA1C MFR BLD HPLC: 6.2 %
HCT VFR BLD CALC: 49.4 %
HCV AB SER QL: NONREACTIVE
HCV S/CO RATIO: 0.08 S/CO
HDLC SERPL-MCNC: 48 MG/DL
HGB BLD-MCNC: 15.7 G/DL
IMM GRANULOCYTES NFR BLD AUTO: 0.4 %
LDLC SERPL CALC-MCNC: 127 MG/DL
LYMPHOCYTES # BLD AUTO: 2.94 K/UL
LYMPHOCYTES NFR BLD AUTO: 21.6 %
MAN DIFF?: NORMAL
MCHC RBC-ENTMCNC: 30.2 PG
MCHC RBC-ENTMCNC: 31.8 GM/DL
MCV RBC AUTO: 95 FL
MONOCYTES # BLD AUTO: 1.26 K/UL
MONOCYTES NFR BLD AUTO: 9.3 %
NEUTROPHILS # BLD AUTO: 9.09 K/UL
NEUTROPHILS NFR BLD AUTO: 66.8 %
NONHDLC SERPL-MCNC: 163 MG/DL
PLATELET # BLD AUTO: 353 K/UL
POTASSIUM SERPL-SCNC: 5 MMOL/L
PROT SERPL-MCNC: 8.3 G/DL
RBC # BLD: 5.2 M/UL
RBC # FLD: 14.5 %
SODIUM SERPL-SCNC: 140 MMOL/L
TRIGL SERPL-MCNC: 181 MG/DL
WBC # FLD AUTO: 13.6 K/UL

## 2021-01-14 ENCOUNTER — RX RENEWAL (OUTPATIENT)
Age: 66
End: 2021-01-14

## 2021-01-21 NOTE — HISTORY OF PRESENT ILLNESS
[FreeTextEntry8] : Pt is here for poss bronchitis. Pt c/o coughing up phlegm, bilateral ears feel clogged, scratchy/sore throat for about 1 week. No fever/chills.
Order was written/H&P was completed/Contractions pattern was reviewed/FHR was reviewed/Induction / Augmentation was discussed
Order was written/H&P was completed/Contractions pattern was reviewed/FHR was reviewed/Induction / Augmentation was discussed

## 2021-01-26 ENCOUNTER — RX RENEWAL (OUTPATIENT)
Age: 66
End: 2021-01-26

## 2021-01-29 ENCOUNTER — APPOINTMENT (OUTPATIENT)
Dept: FAMILY MEDICINE | Facility: CLINIC | Age: 66
End: 2021-01-29
Payer: MEDICARE

## 2021-01-29 VITALS
OXYGEN SATURATION: 98 % | WEIGHT: 210 LBS | TEMPERATURE: 97.2 F | BODY MASS INDEX: 31.83 KG/M2 | DIASTOLIC BLOOD PRESSURE: 74 MMHG | SYSTOLIC BLOOD PRESSURE: 150 MMHG | HEIGHT: 68 IN | RESPIRATION RATE: 14 BRPM | HEART RATE: 93 BPM

## 2021-01-29 VITALS — DIASTOLIC BLOOD PRESSURE: 84 MMHG | SYSTOLIC BLOOD PRESSURE: 128 MMHG

## 2021-01-29 DIAGNOSIS — F41.8 OTHER SPECIFIED ANXIETY DISORDERS: ICD-10-CM

## 2021-01-29 PROCEDURE — 99214 OFFICE O/P EST MOD 30 MIN: CPT

## 2021-01-29 NOTE — HISTORY OF PRESENT ILLNESS
[FreeTextEntry1] : patient presents for blood pressure check  [de-identified] : 64 yo male, pmh of HTN, HLD presents today for follow up. He says he has not been taking amlodipine consistently because he was confused at the dosing. Has been checking his BPs with logs. Logs provided show when he wasn’t taking the norvasc, he was elevated systolic above 150s. When he was taking both BP meds, range was more acceptable, systolic in 130s. Has been tolerating statin without issues, no muscle aches, pains. He says he has been trying to exercise more to help lose weight. He is waiting on covid vaccine scheduling for his first dose. No other complaints.

## 2021-01-29 NOTE — PHYSICAL EXAM
[No Edema] : there was no peripheral edema [Coordination Grossly Intact] : coordination grossly intact [No Focal Deficits] : no focal deficits [Normal Gait] : normal gait [Normal] : affect was normal and insight and judgment were intact [de-identified] : no calf tenderness

## 2021-01-29 NOTE — ASSESSMENT
[FreeTextEntry1] : HTN\par BP log provided. \par Patient was previously confused on med dosing.\par I reviewed BP log with him, told him that he should continue with norvasc 10 and losartan 100 in AM. \par Patient is trying to lose weight, continue lifestyle management. \par Advised to follow up in 1 month for repeat BP check when on meds more consistently. \par I reviewed dec 2020 chemistry showing his renal function was stable, GFR 81.\par I reviewed a1c dec 2020 showing 6.2, in preDM range, however patient still at higher risk with ascvd >17%\par patient agrees to f/u plan, continue bp logs. \par \par HLD\par I reviewed lipid profile from dec 2020. \par Patient has been tolerating the new med, atorvastatin 40 which replaced simvastatin. \par CK levels reviewed from dec 2020, normal range. \par Will recheck his fasting lipids in June 2021 \par \par Anxiety \par Requests refill of buspirone \par is stable otherwise\par denies SI/HI\par \par Agree to 1 month f/u for bp check. \par Patient agrees with plan, all further questions answered during encounter.\par

## 2021-02-01 ENCOUNTER — RX RENEWAL (OUTPATIENT)
Age: 66
End: 2021-02-01

## 2021-02-15 ENCOUNTER — RX RENEWAL (OUTPATIENT)
Age: 66
End: 2021-02-15

## 2021-03-05 ENCOUNTER — APPOINTMENT (OUTPATIENT)
Dept: FAMILY MEDICINE | Facility: CLINIC | Age: 66
End: 2021-03-05
Payer: MEDICARE

## 2021-03-05 ENCOUNTER — RX RENEWAL (OUTPATIENT)
Age: 66
End: 2021-03-05

## 2021-03-05 VITALS
DIASTOLIC BLOOD PRESSURE: 80 MMHG | TEMPERATURE: 97.5 F | OXYGEN SATURATION: 98 % | HEIGHT: 68 IN | SYSTOLIC BLOOD PRESSURE: 140 MMHG | HEART RATE: 87 BPM | BODY MASS INDEX: 31.98 KG/M2 | RESPIRATION RATE: 14 BRPM | WEIGHT: 211 LBS

## 2021-03-05 PROCEDURE — 99213 OFFICE O/P EST LOW 20 MIN: CPT

## 2021-03-05 NOTE — HISTORY OF PRESENT ILLNESS
[FreeTextEntry1] : patient presents for blood pressure check\par  [de-identified] : 64 yo male, pmh of HTN presents today for follow up. Showed BP still in elevated range based on home logs. Still ranging in 140s-150s/80-90s at home, usually closer to 150s/90s. Denies any chest pain, leg swelling, headaches, blurry vision. Has been complying with his existing medication regimen but still is in this range. Says he still drinks coffee but has cut back on the quantity that he consumed previously. No other complaints today.

## 2021-03-05 NOTE — ASSESSMENT
[FreeTextEntry1] : HTN \par noted to be resistent to dual agent treatment\par patient is still uncontrolled and above goal \par is tolerating norvasc 10 and losartan 100 fine\par however given his systolics still persist above 150s and diastolics still above 90s, will add on 3rd agent\par Started HCTZ at 25mg daily\par I reviewed his dec 2020 renal studies to show gfr is stable, and can tolerate diuretic for now\par will monitor his renal function in future to assess impact \par If no improvement noted, may refer to to nephro and cardio for further workup for resistant htn \par \par Patient agrees with plan, all further questions answered during encounter.\par

## 2021-03-05 NOTE — PHYSICAL EXAM
[Normal] : normal rate, regular rhythm, normal S1 and S2 and no murmur heard [No Edema] : there was no peripheral edema [Coordination Grossly Intact] : coordination grossly intact [No Focal Deficits] : no focal deficits [Normal Gait] : normal gait [de-identified] : no calf tenderness

## 2021-03-21 ENCOUNTER — RX RENEWAL (OUTPATIENT)
Age: 66
End: 2021-03-21

## 2021-03-29 ENCOUNTER — NON-APPOINTMENT (OUTPATIENT)
Age: 66
End: 2021-03-29

## 2021-03-29 RX ORDER — SIMVASTATIN 40 MG/1
40 TABLET, FILM COATED ORAL
Qty: 90 | Refills: 0 | Status: DISCONTINUED | COMMUNITY
Start: 2021-03-29

## 2021-03-30 ENCOUNTER — RX RENEWAL (OUTPATIENT)
Age: 66
End: 2021-03-30

## 2021-04-03 ENCOUNTER — RX RENEWAL (OUTPATIENT)
Age: 66
End: 2021-04-03

## 2021-04-08 ENCOUNTER — NON-APPOINTMENT (OUTPATIENT)
Age: 66
End: 2021-04-08

## 2021-04-08 ENCOUNTER — APPOINTMENT (OUTPATIENT)
Dept: FAMILY MEDICINE | Facility: CLINIC | Age: 66
End: 2021-04-08
Payer: MEDICARE

## 2021-04-08 VITALS
TEMPERATURE: 97.6 F | DIASTOLIC BLOOD PRESSURE: 70 MMHG | OXYGEN SATURATION: 97 % | RESPIRATION RATE: 14 BRPM | HEART RATE: 85 BPM | BODY MASS INDEX: 31.83 KG/M2 | SYSTOLIC BLOOD PRESSURE: 120 MMHG | WEIGHT: 210 LBS | HEIGHT: 68 IN

## 2021-04-08 DIAGNOSIS — Z01.818 ENCOUNTER FOR OTHER PREPROCEDURAL EXAMINATION: ICD-10-CM

## 2021-04-08 PROCEDURE — 93000 ELECTROCARDIOGRAM COMPLETE: CPT

## 2021-04-08 PROCEDURE — 99213 OFFICE O/P EST LOW 20 MIN: CPT | Mod: 25

## 2021-04-08 RX ORDER — CEFADROXIL 500 MG/1
500 CAPSULE ORAL TWICE DAILY
Qty: 20 | Refills: 0 | Status: DISCONTINUED | COMMUNITY
Start: 2020-07-27 | End: 2021-04-08

## 2021-04-08 RX ORDER — TRAMADOL HYDROCHLORIDE 50 MG/1
50 TABLET, COATED ORAL
Qty: 112 | Refills: 0 | Status: DISCONTINUED | COMMUNITY
Start: 2018-11-13 | End: 2021-04-08

## 2021-04-08 NOTE — COUNSELING
[Weight management counseling provided] : Weight management [Healthy eating counseling provided] : healthy eating [Activity counseling provided] : activity [Behavioral health counseling provided] : behavioral health  [Fall prevention counseling provided] : fall prevention  [Good understanding] : Patient has a good understanding of disease, goals and obesity follow-up plan [Low Fat Diet] : Low fat diet [Low Salt Diet] : Low salt diet [Decrease Portions] : Decrease food portions [Keep Food Diary] : Keep food diary [___ min/wk activity recommended] : [unfilled] min/wk activity recommended [Walking] : Walking [Sleep ___ hours/day] : Sleep [unfilled] hours/day [Engage in a relaxing activity] : Engage in a relaxing activity [Plan in advance] : Plan in advance [Adequate lighting] : Adequate lighting [No throw rugs] : No throw rugs [Use proper foot wear] : Use proper foot wear [Use recommended devices] : Use recommended devices [None] : None

## 2021-04-08 NOTE — REVIEW OF SYSTEMS
[Joint Pain] : joint pain [Back Pain] : back pain [Negative] : Heme/Lymph [Muscle Pain] : no muscle pain

## 2021-04-08 NOTE — HISTORY OF PRESENT ILLNESS
[No Pertinent Cardiac History] : no history of aortic stenosis, atrial fibrillation, coronary artery disease, recent myocardial infarction, or implantable device/pacemaker [Asthma] : asthma [No Adverse Anesthesia Reaction] : no adverse anesthesia reaction in self or family member [(Patient denies any chest pain, claudication, dyspnea on exertion, orthopnea, palpitations or syncope)] : Patient denies any chest pain, claudication, dyspnea on exertion, orthopnea, palpitations or syncope [Moderate (4-6 METs)] : Moderate (4-6 METs) [Sleep Apnea] : no sleep apnea [Smoker] : not a smoker [Chronic Anticoagulation] : no chronic anticoagulation [Chronic Kidney Disease] : no chronic kidney disease [Diabetes] : no diabetes [FreeTextEntry1] : spinal surgery  [FreeTextEntry2] : 04/12/2021 [FreeTextEntry3] : Dr. Leos  [FreeTextEntry4] : Patient present for medical clearance\par Reviewed PST labs with patient \par EKG performed in office. \par Pending Covid swab today. \par \par J&J vaccine 3 weeks 3/18/2021

## 2021-04-08 NOTE — RESULTS/DATA
[] : results reviewed [Normal] : The 12 - lead ECG is normal [NSR] : normal sinus rhythm [P Waves Normal] : the P wave is normal [ECG Intervals AR.] : AR interval is normal [Normal QRS] : the QRS is normal [ECG Axis] : the QRS axis is normal [Normal ST Segments] : the ST segments are normal [ECG T. Waves] : normal [No Interval Change] : no interval change

## 2021-04-08 NOTE — PHYSICAL EXAM
[No Edema] : there was no peripheral edema [No Joint Swelling] : no joint swelling [Grossly Normal Strength/Tone] : grossly normal strength/tone [Normal] : affect was normal and insight and judgment were intact [de-identified] : lower back TTP

## 2021-04-16 ENCOUNTER — NON-APPOINTMENT (OUTPATIENT)
Age: 66
End: 2021-04-16

## 2021-05-07 ENCOUNTER — APPOINTMENT (OUTPATIENT)
Dept: FAMILY MEDICINE | Facility: CLINIC | Age: 66
End: 2021-05-07
Payer: MEDICARE

## 2021-05-07 VITALS
BODY MASS INDEX: 31.83 KG/M2 | WEIGHT: 210 LBS | RESPIRATION RATE: 14 BRPM | OXYGEN SATURATION: 96 % | TEMPERATURE: 98.1 F | HEIGHT: 68 IN | DIASTOLIC BLOOD PRESSURE: 78 MMHG | SYSTOLIC BLOOD PRESSURE: 130 MMHG | HEART RATE: 81 BPM

## 2021-05-07 DIAGNOSIS — M51.16 INTERVERTEBRAL DISC DISORDERS WITH RADICULOPATHY, LUMBAR REGION: ICD-10-CM

## 2021-05-07 DIAGNOSIS — R53.83 OTHER FATIGUE: ICD-10-CM

## 2021-05-07 PROCEDURE — 99214 OFFICE O/P EST MOD 30 MIN: CPT

## 2021-05-07 RX ORDER — DICLOFENAC SODIUM 75 MG/1
75 TABLET, DELAYED RELEASE ORAL
Qty: 180 | Refills: 0 | Status: DISCONTINUED | COMMUNITY
Start: 2019-07-02 | End: 2021-05-07

## 2021-05-07 NOTE — PHYSICAL EXAM
[No Lymphadenopathy] : no lymphadenopathy [Supple] : supple [Pedal Pulses Present] : the pedal pulses are present [No Edema] : there was no peripheral edema [No CVA Tenderness] : no CVA  tenderness [Coordination Grossly Intact] : coordination grossly intact [No Focal Deficits] : no focal deficits [Normal] : affect was normal and insight and judgment were intact [de-identified] : no calf tenderness bilaterally [de-identified] : surgical incision noted, well healed over lumbar midline sine [de-identified] : using cane for gait assist while ambulating

## 2021-05-07 NOTE — HISTORY OF PRESENT ILLNESS
[FreeTextEntry1] : pt presents for bp check  [de-identified] : 66 yo male, pmh of HTN, chronic back pain, who presents today for follow up. \clifton Had spinal surgery, 4/12/21, was admitted for 3 days post op. \clifton Has PT scheduled for next week. \clifton Has been using cane to help ambulate since surgery.\clifton Has been keeping BP log, doing well in range 110-140s/60s-80s. \clifton Does complain of having some fatigue since surgery, but says that has been more sedentary due to pain when on feet for long period of time. Prior to surgery he had no issues. Finds himself falling asleep more often during the day time when he is sitting on the couch. \par No other complaints today. \par

## 2021-05-07 NOTE — ASSESSMENT
[FreeTextEntry1] : HTN- stable\par -Continue current regimen of norvasc, hctz, losartan\par -Continue regular bp checks at home  \par -Avoid salt\par -encouraged diet and exercise\par -Labs ordered to evaluate for renal function, a1c and lipids for risk stratification \par f/u 3 months, pt agreed w/plan\par \par HLD\par ordered fasting lipid profile\par has been on lipitor since december\par was previously on simvastatin\par checking lipids to see if improved\par renewed statin as part of combo pill with norvasc\par counseled patient to stop taking individuals when he starts on combo \par \par Intervertebral disc disordered with arthritis\par chronic \par given increased risk of cardiovascular disease adverse outcomes in diclofenac and norvasc, we discussed and patient agreed to stop diclofenac \par sending naproxen instead as noted to have more cardioprotective effect than other nsaids \par counseled to make sure not to mix with other nsaids. take with food. stop if bleeding occurs or experiences severe GI upset. \par \par Fatigue\par checking tsh \par suspect secondary to sedentary changes from recent surgery \par advised he f/u with PT to increase his mobility and activity level post operatively. \par \par will call with results. F/u 3 months, sooner PRN \par Patient agrees with plan, all further questions answered during encounter.\par \par

## 2021-06-09 ENCOUNTER — RX RENEWAL (OUTPATIENT)
Age: 66
End: 2021-06-09

## 2021-06-20 ENCOUNTER — RX RENEWAL (OUTPATIENT)
Age: 66
End: 2021-06-20

## 2021-06-21 ENCOUNTER — RX RENEWAL (OUTPATIENT)
Age: 66
End: 2021-06-21

## 2021-07-02 ENCOUNTER — RX RENEWAL (OUTPATIENT)
Age: 66
End: 2021-07-02

## 2021-08-10 ENCOUNTER — APPOINTMENT (OUTPATIENT)
Dept: FAMILY MEDICINE | Facility: CLINIC | Age: 66
End: 2021-08-10
Payer: MEDICARE

## 2021-08-10 VITALS
WEIGHT: 209 LBS | RESPIRATION RATE: 14 BRPM | TEMPERATURE: 98 F | HEIGHT: 68 IN | BODY MASS INDEX: 31.67 KG/M2 | OXYGEN SATURATION: 96 % | DIASTOLIC BLOOD PRESSURE: 80 MMHG | HEART RATE: 84 BPM | SYSTOLIC BLOOD PRESSURE: 128 MMHG

## 2021-08-10 DIAGNOSIS — M54.32 SCIATICA, LEFT SIDE: ICD-10-CM

## 2021-08-10 PROCEDURE — 36415 COLL VENOUS BLD VENIPUNCTURE: CPT

## 2021-08-10 PROCEDURE — 99214 OFFICE O/P EST MOD 30 MIN: CPT | Mod: 25

## 2021-08-10 RX ORDER — TRAMADOL HYDROCHLORIDE 50 MG/1
50 TABLET, COATED ORAL 4 TIMES DAILY
Qty: 40 | Refills: 0 | Status: ACTIVE | COMMUNITY
Start: 2021-08-10 | End: 1900-01-01

## 2021-08-10 NOTE — ASSESSMENT
[FreeTextEntry1] : Chronic Left Hip Pain with Sciatica with Arthritis \par f/u with ortho surgery\par patient adamant about request for MR left hip and xray left hip, I provided him scripts but advised f/u with ortho first\par Started on gabapentin 300 for sciatic nerve pain\par Breakthrough pain with Tramadol 50 q6h prn. \par renal function from april 2021 wnl \par checking repeat cmp today \par advised 2 week f/u for pain assessment \par istop:| Reference #: [id number] \par \par PND\par sending medrol dose pack for congestion\par tessalon perles for cough\par atrovent for nasal rhinnorhea\par advised to call if symptoms do not improve\par \par HTN- stable\par -Continue current regimen of norvasc, arb, hctz\par -Continue regular bp checks at home and advised to bring machine to office with readings next visit\par -Avoid salt\par -encouraged diet and exercise\par -Labs ordered to evaluate for renal function, blood count\par \par HLD\par continue statin use\par Lipid panel reviewed from dec 2020\par LFTs today\par Fasting lipid profile ordered today.\par \par will call with results\par 2 week f.u for pain\par Patient agrees with plan, all further questions answered during encounter.\par \par

## 2021-08-10 NOTE — PHYSICAL EXAM
[Well Nourished] : well nourished [Well Developed] : well developed [Normal Outer Ear/Nose] : the outer ears and nose were normal in appearance [Normal Oropharynx] : the oropharynx was normal [No Lymphadenopathy] : no lymphadenopathy [Supple] : supple [No CVA Tenderness] : no CVA  tenderness [No Spinal Tenderness] : no spinal tenderness [Grossly Normal Strength/Tone] : grossly normal strength/tone [Coordination Grossly Intact] : coordination grossly intact [No Focal Deficits] : no focal deficits [Normal] : affect was normal and insight and judgment were intact [de-identified] : moderate discomfort from pain  [de-identified] : boggy nasal mucosa  [de-identified] : +SLR left leg at 30 degrees. Tenderness at hip on flexion and extension  [de-identified] : abnormal gait secondary to left hip pain.

## 2021-08-10 NOTE — REVIEW OF SYSTEMS
[Cough] : cough [Back Pain] : back pain [Negative] : Neurological [Postnasal Drip] : postnasal drip [Nasal Discharge] : nasal discharge [Sore Throat] : sore throat [Earache] : no earache [Hearing Loss] : no hearing loss [Nosebleeds] : no nosebleeds [Hoarseness] : no hoarseness [Shortness Of Breath] : no shortness of breath [Wheezing] : no wheezing [Dyspnea on Exertion] : not dyspnea on exertion [Joint Pain] : no joint pain [Joint Stiffness] : no joint stiffness [Muscle Pain] : no muscle pain [Muscle Weakness] : no muscle weakness [Joint Swelling] : no joint swelling

## 2021-08-10 NOTE — HISTORY OF PRESENT ILLNESS
[FreeTextEntry1] : Patient presents for 3 month check\par  [de-identified] : 64 yo pmh of HTN, chronic back pain who comes today for follow up. \par Had back surgery done back in April. \par Has been in PT. \par Still with back pain, dull, deep, achy, unable to lay flat in bed. \par Has not changed. Located in left lower back and runs through the left lateral leg and sometimes down to the left calf.  Sometimes with numbness in left leg. \par Denies any bladder or bowel incontinence. \par Has appointment to follow up with surgeon Dr. Miner today. \par Had previously been started on medrol dose back that did not help for his pain either. \par \par Also with congestion, ears clogged, Mucous in back in throat. \par Has been bothering him for a few weeks not. \par When waking up in the morning, coughing with green colored mucous. \par No fevers. +nasal congestion. +runny nose. +ear pressure. \par \par No other complaints today.

## 2021-08-11 LAB
ALBUMIN SERPL ELPH-MCNC: 4.7 G/DL
ALP BLD-CCNC: 85 U/L
ALT SERPL-CCNC: 15 U/L
ANION GAP SERPL CALC-SCNC: 12 MMOL/L
AST SERPL-CCNC: 16 U/L
BASOPHILS # BLD AUTO: 0.04 K/UL
BASOPHILS NFR BLD AUTO: 0.3 %
BILIRUB SERPL-MCNC: 0.4 MG/DL
BUN SERPL-MCNC: 17 MG/DL
CALCIUM SERPL-MCNC: 10 MG/DL
CHLORIDE SERPL-SCNC: 105 MMOL/L
CHOLEST SERPL-MCNC: 179 MG/DL
CO2 SERPL-SCNC: 24 MMOL/L
CREAT SERPL-MCNC: 0.93 MG/DL
EOSINOPHIL # BLD AUTO: 0.25 K/UL
EOSINOPHIL NFR BLD AUTO: 2 %
ESTIMATED AVERAGE GLUCOSE: 126 MG/DL
GLUCOSE SERPL-MCNC: 103 MG/DL
HBA1C MFR BLD HPLC: 6 %
HCT VFR BLD CALC: 46.7 %
HDLC SERPL-MCNC: 46 MG/DL
HGB BLD-MCNC: 14.9 G/DL
IMM GRANULOCYTES NFR BLD AUTO: 0.2 %
LDLC SERPL CALC-MCNC: 110 MG/DL
LYMPHOCYTES # BLD AUTO: 3.24 K/UL
LYMPHOCYTES NFR BLD AUTO: 25.3 %
MAN DIFF?: NORMAL
MCHC RBC-ENTMCNC: 30.4 PG
MCHC RBC-ENTMCNC: 31.9 GM/DL
MCV RBC AUTO: 95.3 FL
MONOCYTES # BLD AUTO: 1.13 K/UL
MONOCYTES NFR BLD AUTO: 8.8 %
NEUTROPHILS # BLD AUTO: 8.11 K/UL
NEUTROPHILS NFR BLD AUTO: 63.4 %
NONHDLC SERPL-MCNC: 133 MG/DL
PLATELET # BLD AUTO: 303 K/UL
POTASSIUM SERPL-SCNC: 4.6 MMOL/L
PROT SERPL-MCNC: 7.1 G/DL
RBC # BLD: 4.9 M/UL
RBC # FLD: 16.3 %
SODIUM SERPL-SCNC: 140 MMOL/L
TRIGL SERPL-MCNC: 113 MG/DL
WBC # FLD AUTO: 12.8 K/UL

## 2021-08-24 ENCOUNTER — APPOINTMENT (OUTPATIENT)
Dept: FAMILY MEDICINE | Facility: CLINIC | Age: 66
End: 2021-08-24
Payer: MEDICARE

## 2021-08-24 VITALS
OXYGEN SATURATION: 97 % | DIASTOLIC BLOOD PRESSURE: 80 MMHG | TEMPERATURE: 98.1 F | RESPIRATION RATE: 14 BRPM | HEIGHT: 68 IN | WEIGHT: 209 LBS | SYSTOLIC BLOOD PRESSURE: 128 MMHG | BODY MASS INDEX: 31.67 KG/M2 | HEART RATE: 81 BPM

## 2021-08-24 DIAGNOSIS — N49.2 INFLAMMATORY DISORDERS OF SCROTUM: ICD-10-CM

## 2021-08-24 PROCEDURE — 99214 OFFICE O/P EST MOD 30 MIN: CPT

## 2021-08-24 NOTE — ASSESSMENT
[FreeTextEntry1] : Chronic Left Hip Pain\par in exacerbation/progressive worsening given radiology findings\par Reviewed MRI from 8/14/2021, concern for soft tissue edema vs possible loosening of hip arthroplasty\par Reviewed xray from 8/11/2021\par advised he follow up with ortho/hip to evaluate\par still tolerates PT \par trial of diclofenac gel topical prn, cmp noted from aug 2021, normal renal function to be on nsaids. \par advised not to mix with nsaids or asa to avoid risk of GI bleeds\par advised to follow up if pain still uncontrolled\par \par Scrotum Cellulitis, acute\par started on keflex 500 bid \par advised to take with probiotic\par advised to notify me if symptoms do not improve or worsen.  \par \par \par Patient agrees with plan, all further questions answered during encounter.\par \par

## 2021-08-24 NOTE — PHYSICAL EXAM
[Coordination Grossly Intact] : coordination grossly intact [No Focal Deficits] : no focal deficits [Normal] : affect was normal and insight and judgment were intact [de-identified] : posterior midline scrotum with area of induration, mild erythema, no active drainage, mildly tender.  [de-identified] : non tender to palpation over left lateral and posterior hip.  [de-identified] : normal gait when assessed walking into and out of room and onto and off exam table.

## 2021-08-24 NOTE — HISTORY OF PRESENT ILLNESS
[FreeTextEntry1] : pt presents for med follow up  [de-identified] : 64 yo male, HTN, chronic back and hip pain who presents today for follow up. \par Right now hip pain is stable, but easily comes if he walks on legs for a short duration. \par Aggravated with walking long distances. \par Is still going to PT for the back and is tolerating it fine. \par Has not yet made follow up appointment with ortho hip to evaluate his current symptoms but says that he will do so. \par Says that tramadol has not helped with pain. Would like to try something different. \par \par Today also with new complaint of discharge from pimple with pus in the groin area. Noted first on saturday. Has been getting larger, says that he was feeling and squeezing it in the shower over the weekend and pus and small amount of blood was expressed. Says it happened same 2 years ago, was given abx for 1 week and it resolved previously. \par \par No other complaints today

## 2021-08-24 NOTE — REVIEW OF SYSTEMS
[Joint Pain] : joint pain [Back Pain] : back pain [Unsteady Walk] : ataxia [Negative] : Respiratory [Joint Stiffness] : no joint stiffness [Muscle Pain] : no muscle pain [Muscle Weakness] : no muscle weakness [Joint Swelling] : no joint swelling [Headache] : no headache [Dizziness] : no dizziness [Fainting] : no fainting [Confusion] : no confusion [Memory Loss] : no memory loss

## 2021-09-02 ENCOUNTER — RX RENEWAL (OUTPATIENT)
Age: 66
End: 2021-09-02

## 2021-09-08 ENCOUNTER — RX RENEWAL (OUTPATIENT)
Age: 66
End: 2021-09-08

## 2021-09-18 ENCOUNTER — RX RENEWAL (OUTPATIENT)
Age: 66
End: 2021-09-18

## 2021-10-13 ENCOUNTER — RX RENEWAL (OUTPATIENT)
Age: 66
End: 2021-10-13

## 2021-10-14 ENCOUNTER — RX RENEWAL (OUTPATIENT)
Age: 66
End: 2021-10-14

## 2021-10-14 RX ORDER — GABAPENTIN 300 MG/1
300 CAPSULE ORAL
Qty: 90 | Refills: 0 | Status: DISCONTINUED | COMMUNITY
Start: 2021-08-10 | End: 2021-10-14

## 2021-10-29 ENCOUNTER — RX RENEWAL (OUTPATIENT)
Age: 66
End: 2021-10-29

## 2021-11-16 ENCOUNTER — APPOINTMENT (OUTPATIENT)
Dept: FAMILY MEDICINE | Facility: CLINIC | Age: 66
End: 2021-11-16
Payer: MEDICARE

## 2021-11-16 VITALS
BODY MASS INDEX: 30.92 KG/M2 | HEART RATE: 97 BPM | RESPIRATION RATE: 15 BRPM | HEIGHT: 68 IN | TEMPERATURE: 97.3 F | SYSTOLIC BLOOD PRESSURE: 138 MMHG | DIASTOLIC BLOOD PRESSURE: 84 MMHG | OXYGEN SATURATION: 96 % | WEIGHT: 204 LBS

## 2021-11-16 DIAGNOSIS — Z23 ENCOUNTER FOR IMMUNIZATION: ICD-10-CM

## 2021-11-16 DIAGNOSIS — M19.90 UNSPECIFIED OSTEOARTHRITIS, UNSPECIFIED SITE: ICD-10-CM

## 2021-11-16 PROCEDURE — G0009: CPT

## 2021-11-16 PROCEDURE — 99214 OFFICE O/P EST MOD 30 MIN: CPT | Mod: 25

## 2021-11-16 PROCEDURE — 90670 PCV13 VACCINE IM: CPT

## 2021-11-16 PROCEDURE — G0008: CPT

## 2021-11-16 PROCEDURE — 90662 IIV NO PRSV INCREASED AG IM: CPT

## 2021-11-16 RX ORDER — METHYLPREDNISOLONE 4 MG/1
4 TABLET ORAL
Qty: 1 | Refills: 0 | Status: DISCONTINUED | COMMUNITY
Start: 2021-08-10 | End: 2021-11-16

## 2021-11-16 RX ORDER — DICLOFENAC SODIUM 1% 10 MG/G
1 GEL TOPICAL
Qty: 100 | Refills: 1 | Status: DISCONTINUED | COMMUNITY
Start: 2021-08-24 | End: 2021-11-16

## 2021-11-16 RX ORDER — BENZONATATE 100 MG/1
100 CAPSULE ORAL 3 TIMES DAILY
Qty: 21 | Refills: 0 | Status: DISCONTINUED | COMMUNITY
Start: 2021-08-10 | End: 2021-11-16

## 2021-11-16 RX ORDER — CEPHALEXIN 500 MG/1
500 TABLET ORAL TWICE DAILY
Qty: 14 | Refills: 0 | Status: DISCONTINUED | COMMUNITY
Start: 2021-08-24 | End: 2021-11-16

## 2021-11-16 NOTE — PHYSICAL EXAM
[Pedal Pulses Present] : the pedal pulses are present [No Edema] : there was no peripheral edema [Soft] : abdomen soft [Non Tender] : non-tender [Non-distended] : non-distended [Normal Bowel Sounds] : normal bowel sounds [Normal] : no joint swelling and grossly normal strength and tone [Coordination Grossly Intact] : coordination grossly intact [No Focal Deficits] : no focal deficits [Normal Gait] : normal gait [de-identified] : no calf tenderness bilaterally

## 2021-11-16 NOTE — ASSESSMENT
[FreeTextEntry1] : HTN- stable\par -Continue current regimen of norvasc, arb, hctz\par -Avoid salt\par -encouraged diet and exercise\par \par COPD\par stable, not in acute exacerbation\par c/w current inhalers\par prophylaxis with Flu and PNA 13 shots today. \par PNA23 in 1 year. \par \par Arthritis, chronic\par stable today\par takes naproxen prn \par stopped using diclofenac gel \par sending refill of robaxin \par advised him of side effects of sedation and not to mix with other sedatives or drive car\par patient agrees and says he only takes at the end of the day at home if he is flaring with muscle pain\par \par f/u 3 months for htn and repeat labs \par Patient agrees with plan, all further questions answered during encounter.\par

## 2021-11-16 NOTE — HISTORY OF PRESENT ILLNESS
[FreeTextEntry1] : Patient presents for a follow up on blood pressure, flu shot, pneumonia shot, and possible medication renewals  [de-identified] : 66 yo male, pmh of COPD not on home o2, Chronic Arthritis, HTN who presents for follow up and vaccines. \par Doing well today. No acute complaints. \clifton Wants flu and pna shots today. Has never gotten any pna shots before. \clifton Says that he has occasional muscle spasm, uses muscle relaxer and nsaid prn.\par Only flairs when he is being active around the house. Has not had flair recently. \par Requests refill of muscle relaxer that he takes PRN. \par Usually Robaxin 1500 daily PRN\par No other acute complaints today.

## 2021-11-26 ENCOUNTER — RX RENEWAL (OUTPATIENT)
Age: 66
End: 2021-11-26

## 2021-12-01 ENCOUNTER — RX RENEWAL (OUTPATIENT)
Age: 66
End: 2021-12-01

## 2021-12-22 ENCOUNTER — APPOINTMENT (OUTPATIENT)
Dept: FAMILY MEDICINE | Facility: CLINIC | Age: 66
End: 2021-12-22
Payer: MEDICARE

## 2021-12-22 DIAGNOSIS — R09.82 POSTNASAL DRIP: ICD-10-CM

## 2021-12-22 DIAGNOSIS — J06.9 ACUTE UPPER RESPIRATORY INFECTION, UNSPECIFIED: ICD-10-CM

## 2021-12-22 PROCEDURE — 99441: CPT | Mod: 95

## 2021-12-22 NOTE — ASSESSMENT
[FreeTextEntry1] : Viral URI with cough and PND\par Patient's signs and symptoms consistent with URI.\par Atrovent nasal spray to dry out post nasal drip, advised to avoid if headache or nose bleeds\par Medrol dose pack to help decongest, advised of steroid side effects including insomnia and GI upset and to take with food. \par pt says cough not as bothersome to him and declines cough medication. \par Patient educated about signs and symptoms of URI, they typically last up to 14 days but may last longer. \par Patient advised to continue any prescribed and OTC medication for symptomatic relief. \par Patient also advised to call back if symptoms worsening for potential ABX. \par \par \par Patient agrees with plan, all further questions answered during encounter.\par

## 2021-12-22 NOTE — HISTORY OF PRESENT ILLNESS
[Home] : at home, [unfilled] , at the time of the visit. [Medical Office: ([facility])___] : at the medical office located in  [Spouse] : spouse [Verbal consent obtained from patient] : the patient, [unfilled] [FreeTextEntry8] : 67 yo male, hx of copd who presents via telephonic for complaint of uri symptoms. \par Complaint of sore throat that started today. \par +runny nose. +PND\par +cough productive of +yellow color small amount of sputum. Cough is worse in morning when waking up as he has lots of mucous at that time. \par No fevers. \par No dyspnea. \par No chest pain. \par +sick contact at home, viral bronchitis with wife. \par No other symptoms.

## 2021-12-22 NOTE — REVIEW OF SYSTEMS
[Postnasal Drip] : postnasal drip [Nasal Discharge] : nasal discharge [Sore Throat] : sore throat [Cough] : cough [Negative] : Cardiovascular [Earache] : no earache [Hearing Loss] : no hearing loss [Shortness Of Breath] : no shortness of breath [Wheezing] : no wheezing [Dyspnea on Exertion] : not dyspnea on exertion

## 2021-12-29 ENCOUNTER — APPOINTMENT (OUTPATIENT)
Dept: FAMILY MEDICINE | Facility: CLINIC | Age: 66
End: 2021-12-29
Payer: MEDICARE

## 2021-12-29 DIAGNOSIS — B96.89 ACUTE UPPER RESPIRATORY INFECTION, UNSPECIFIED: ICD-10-CM

## 2021-12-29 DIAGNOSIS — J06.9 ACUTE UPPER RESPIRATORY INFECTION, UNSPECIFIED: ICD-10-CM

## 2021-12-29 PROCEDURE — 99442: CPT | Mod: 95

## 2021-12-29 NOTE — HISTORY OF PRESENT ILLNESS
[Home] : at home, [unfilled] , at the time of the visit. [Medical Office: ([facility])___] : at the medical office located in  [Verbal consent obtained from patient] : the patient, [unfilled] [FreeTextEntry8] : Verbal consent was obtained from patient for telephonic visit on 12/29/21\par \par Admits to continued sinus congestion and cough with yellow/greening phlegm. Denies fever, chills. \par

## 2021-12-29 NOTE — REVIEW OF SYSTEMS
[Fatigue] : fatigue [Nasal Discharge] : nasal discharge [Sore Throat] : no sore throat [Hoarseness] : hoarseness [Shortness Of Breath] : no shortness of breath [Wheezing] : no wheezing [Cough] : cough [Negative] : Heme/Lymph

## 2021-12-29 NOTE — PLAN
[FreeTextEntry1] : 66 yr old male with continued symptoms \par bacterial URI \par start Augmentin as directed \par continue nasal spray and decongestant \par Tylenol and or Motrin as directed \par continue all medications as directed \par RTO as routine for follow up.

## 2022-01-05 ENCOUNTER — RX RENEWAL (OUTPATIENT)
Age: 67
End: 2022-01-05

## 2022-01-05 RX ORDER — AMLODIPINE BESYLATE 10 MG/1
10 TABLET ORAL
Qty: 30 | Refills: 1 | Status: DISCONTINUED | COMMUNITY
Start: 2020-12-11 | End: 2022-01-05

## 2022-01-05 RX ORDER — ATORVASTATIN CALCIUM 40 MG/1
40 TABLET, FILM COATED ORAL
Qty: 90 | Refills: 0 | Status: DISCONTINUED | COMMUNITY
Start: 2020-12-31 | End: 2022-01-05

## 2022-01-12 ENCOUNTER — RX RENEWAL (OUTPATIENT)
Age: 67
End: 2022-01-12

## 2022-01-13 ENCOUNTER — TRANSCRIPTION ENCOUNTER (OUTPATIENT)
Age: 67
End: 2022-01-13

## 2022-01-27 ENCOUNTER — RX RENEWAL (OUTPATIENT)
Age: 67
End: 2022-01-27

## 2022-02-07 ENCOUNTER — APPOINTMENT (OUTPATIENT)
Dept: FAMILY MEDICINE | Facility: CLINIC | Age: 67
End: 2022-02-07
Payer: MEDICARE

## 2022-02-07 VITALS
OXYGEN SATURATION: 98 % | DIASTOLIC BLOOD PRESSURE: 78 MMHG | BODY MASS INDEX: 32.74 KG/M2 | TEMPERATURE: 97.6 F | HEART RATE: 92 BPM | HEIGHT: 68 IN | SYSTOLIC BLOOD PRESSURE: 120 MMHG | WEIGHT: 216 LBS | RESPIRATION RATE: 15 BRPM

## 2022-02-07 DIAGNOSIS — M25.552 PAIN IN LEFT HIP: ICD-10-CM

## 2022-02-07 DIAGNOSIS — H61.21 IMPACTED CERUMEN, RIGHT EAR: ICD-10-CM

## 2022-02-07 DIAGNOSIS — G89.29 PAIN IN LEFT HIP: ICD-10-CM

## 2022-02-07 PROCEDURE — 99214 OFFICE O/P EST MOD 30 MIN: CPT | Mod: 25

## 2022-02-07 PROCEDURE — 36415 COLL VENOUS BLD VENIPUNCTURE: CPT

## 2022-02-07 RX ORDER — ASPIRIN 81 MG
6.5 TABLET, DELAYED RELEASE (ENTERIC COATED) ORAL
Qty: 1 | Refills: 0 | Status: ACTIVE | COMMUNITY
Start: 2022-02-07 | End: 1900-01-01

## 2022-02-07 NOTE — PHYSICAL EXAM
[Normal] : normal sclera/conjunctiva, pupils are equal, round and reactive to light and extraocular movements are intact [No Lymphadenopathy] : no lymphadenopathy [Supple] : supple [No Respiratory Distress] : no respiratory distress  [No Accessory Muscle Use] : no accessory muscle use [Normal Rate] : normal rate  [Regular Rhythm] : with a regular rhythm [Normal S1, S2] : normal S1 and S2 [Soft] : abdomen soft [Non Tender] : non-tender [Non-distended] : non-distended [Normal Bowel Sounds] : normal bowel sounds [Coordination Grossly Intact] : coordination grossly intact [No Focal Deficits] : no focal deficits [de-identified] : impacted cerumen, right ear. left ear TM wnl.  [de-identified] : decreased bibasilar breath sounds.

## 2022-02-07 NOTE — HISTORY OF PRESENT ILLNESS
[FreeTextEntry1] : Patient presents for fasting labs and clogged ears would like them looked at  [de-identified] : 65 yo male, hx of COPD, HTN, HLD who presents today for follow up. \par Continues to use Dulera inhaler that controls his COPD. Has not seen pulm in some time. \par Also wishes to have right ear looked up. \par Feels popping sensation. \par Uses qtips. \par No hearing loss.\par Just feels uncomfortable. \par No ear pain. \par He also wants to check urine drug test to make sure he no longer has marijuana in his system that he was previously using to help with pain control.

## 2022-02-07 NOTE — ASSESSMENT
[FreeTextEntry1] : COPD\par chronic, stable\par not on o2 \par c/w dulera\par provided pulm referral to get baseline pfts as he has not gone in some time\par \par HTN- stable\par -Continue current regimen of norvasc \par -Continue regular bp checks at home and advised to bring machine to office with readings next visit\par -Avoid salt\par -encouraged diet and exercise\par -Labs ordered to evaluate for cbc for blood count, cmp for renal function \par \par HLD\par continue statin use\par Lipid panel reviewed from 8/2021\par LFTs today\par Fasting lipid profile ordered today.\par \par Pre-Dm\par a1c noted from 8/2021\par checking a1c today\par \par Chronic Pain\par previously was noted to test positive for thc on urine drug test\par repeat today as patient has not taken in some time due to pain being better controlled. \par \par Impacted Cerumen\par debrox drops at bedtime to affected ear x 1 week then return for irrigation if needed\par avoid qtips and putting anything in ears\par \par f/u 3 months or sooner prn\par labs drawn in office and patient will be notified of results when available\par Patient agrees with plan, all further questions answered during encounter.\par

## 2022-02-08 LAB
ALBUMIN SERPL ELPH-MCNC: 4.7 G/DL
ALP BLD-CCNC: 86 U/L
ALT SERPL-CCNC: 22 U/L
ANION GAP SERPL CALC-SCNC: 16 MMOL/L
AST SERPL-CCNC: 17 U/L
BASOPHILS # BLD AUTO: 0.05 K/UL
BASOPHILS NFR BLD AUTO: 0.5 %
BILIRUB SERPL-MCNC: 0.3 MG/DL
BUN SERPL-MCNC: 22 MG/DL
CALCIUM SERPL-MCNC: 10 MG/DL
CHLORIDE SERPL-SCNC: 107 MMOL/L
CHOLEST SERPL-MCNC: 176 MG/DL
CO2 SERPL-SCNC: 20 MMOL/L
CREAT SERPL-MCNC: 1.06 MG/DL
EOSINOPHIL # BLD AUTO: 0.29 K/UL
EOSINOPHIL NFR BLD AUTO: 2.8 %
ESTIMATED AVERAGE GLUCOSE: 128 MG/DL
GLUCOSE SERPL-MCNC: 109 MG/DL
HBA1C MFR BLD HPLC: 6.1 %
HCT VFR BLD CALC: 46.2 %
HDLC SERPL-MCNC: 52 MG/DL
HGB BLD-MCNC: 14.5 G/DL
IMM GRANULOCYTES NFR BLD AUTO: 0.3 %
LDLC SERPL CALC-MCNC: 100 MG/DL
LYMPHOCYTES # BLD AUTO: 2.8 K/UL
LYMPHOCYTES NFR BLD AUTO: 27 %
MAN DIFF?: NORMAL
MCHC RBC-ENTMCNC: 29.5 PG
MCHC RBC-ENTMCNC: 31.4 GM/DL
MCV RBC AUTO: 93.9 FL
MONOCYTES # BLD AUTO: 1.1 K/UL
MONOCYTES NFR BLD AUTO: 10.6 %
NEUTROPHILS # BLD AUTO: 6.09 K/UL
NEUTROPHILS NFR BLD AUTO: 58.8 %
NONHDLC SERPL-MCNC: 124 MG/DL
PLATELET # BLD AUTO: 324 K/UL
POTASSIUM SERPL-SCNC: 5.4 MMOL/L
PROT SERPL-MCNC: 7.4 G/DL
RBC # BLD: 4.92 M/UL
RBC # FLD: 15.9 %
SODIUM SERPL-SCNC: 143 MMOL/L
TRIGL SERPL-MCNC: 119 MG/DL
WBC # FLD AUTO: 10.36 K/UL

## 2022-02-14 ENCOUNTER — TRANSCRIPTION ENCOUNTER (OUTPATIENT)
Age: 67
End: 2022-02-14

## 2022-02-14 ENCOUNTER — APPOINTMENT (OUTPATIENT)
Dept: FAMILY MEDICINE | Facility: CLINIC | Age: 67
End: 2022-02-14

## 2022-02-14 LAB — COMPREHENSIVE SCREEN URINE: NORMAL

## 2022-02-24 ENCOUNTER — APPOINTMENT (OUTPATIENT)
Dept: PULMONOLOGY | Facility: CLINIC | Age: 67
End: 2022-02-24
Payer: MEDICARE

## 2022-02-24 VITALS
SYSTOLIC BLOOD PRESSURE: 131 MMHG | TEMPERATURE: 97 F | HEART RATE: 79 BPM | OXYGEN SATURATION: 96 % | BODY MASS INDEX: 32.74 KG/M2 | WEIGHT: 216 LBS | HEIGHT: 68 IN | DIASTOLIC BLOOD PRESSURE: 78 MMHG

## 2022-02-24 DIAGNOSIS — Z87.891 PERSONAL HISTORY OF NICOTINE DEPENDENCE: ICD-10-CM

## 2022-02-24 PROCEDURE — 99204 OFFICE O/P NEW MOD 45 MIN: CPT

## 2022-02-24 RX ORDER — AMOXICILLIN AND CLAVULANATE POTASSIUM 875; 125 MG/1; MG/1
875-125 TABLET, COATED ORAL
Qty: 10 | Refills: 0 | Status: DISCONTINUED | COMMUNITY
Start: 2021-12-29 | End: 2022-02-24

## 2022-02-24 RX ORDER — METHYLPREDNISOLONE 4 MG/1
4 TABLET ORAL
Qty: 1 | Refills: 0 | Status: DISCONTINUED | COMMUNITY
Start: 2021-12-22 | End: 2022-02-24

## 2022-02-24 RX ORDER — LEVOCETIRIZINE DIHYDROCHLORIDE 5 MG/1
5 TABLET ORAL DAILY
Qty: 7 | Refills: 0 | Status: DISCONTINUED | COMMUNITY
Start: 2021-12-29 | End: 2022-02-24

## 2022-02-24 RX ORDER — HYDROCHLOROTHIAZIDE 25 MG/1
25 TABLET ORAL DAILY
Qty: 90 | Refills: 0 | Status: DISCONTINUED | COMMUNITY
Start: 2021-03-05 | End: 2022-02-24

## 2022-02-24 NOTE — HISTORY OF PRESENT ILLNESS
[Former] : former [Never] : never [TextBox_4] : 66 male quit tobacco 17 yrs ago 0.75 ppd x 30 yrs\par Presents  today because pmd asked for evaluation for copd\par no sob no cough no wheeze no chest pain no tightness\par no hosp for dyspnea \par full activities of daily living \par no nite awakening\par precipitating factors none\par use dulera infrequently\par Occupation retired  no asbestos exposure\par  [TextBox_11] : 0.75 [YearQuit] : 2005 [TextBox_13] : 30

## 2022-02-24 NOTE — PHYSICAL EXAM
Referred by: Pastora Shin, *; Medical Diagnosis (from order):    Diagnosis Information      Diagnosis    724.4, 338.29 (ICD-9-CM) - M54.16, G89.29 (ICD-10-CM) - Chronic radicular pain of lower back              Physical Therapy -  Initial Evaluation    Visit:  1   Treatment Diagnosis: lumbarsymptoms with increased pain/symptoms, impaired posture, impaired muscle length/flexibility, impaired mobility  Date of onset: ~1 yearDate of exacerbation: last 2-3 months    Chart reviewed at time of initial evaluation (relevant co-morbidities, allergies, tests and medications listed): Reviewed with patient     SUBJECTIVE                                                                                                             Notes left sided buttock and periodic low back pain that has been going on for almost a year but has gotten worse over the last few weeks due to decreased activity. Feels it especially when shifting weight side to side when sitting and standing. 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 Tries to stay active each day and is doing low and high impact activities in which the back doesn't bother her. Denies tingling and numbness. Went to chiropractor for first time last week. Didn't notice anything significant following but has a follow-up next week.            Pain / Symptoms:  Pain/symptom is: constant  Pain rating (out of 10): Current: 1 ; Worst: 5  Location: Left sided buttock into piriformis    Quality / Description: sore.  Alleviating Factors: avoiding movement in involved area, heat. Not consistent with heat - didn't feel like it helped.     Progression since onset: worsening  Function:   Limitations / Exacerbation Factors: Sitting or standing lateral weight shifting   Prior Level of Function: pain free ADLs and IADLs,  Patient Goals: decreased pain, independence with ADLs/IADLs, return to sport/leisure  activities and increased motion    Prior treatment: chiropractic services  Discharged from hospital, home health, or skilled nursing facility in last 30 days: no    Home Environment:   Patient lives with children. significant other  Assistance available: as needed  Feels safe at home/work/school.  2 or more falls or an unexplained fall with injury in the last year:  No    OBJECTIVE                                                                                                                     Observation:   Spine: increased lumbar lordosis (in standing )  Comments / Details: No significant gait deviations  Range of Motion (ROM)   (norms in parentheses, degrees unless noted, active unless noted):   Lumbar:    - Flexion(60-80):  WNL     - Extension (25):  WNL     - Rotation (30/45):        • Left:  WNL         • Right:  WNL     - Side Bend (25-35):        • Left:  WNL and pain         • Right:  WNL   Details / Comments: Mild pain with left SBing noted     Strength  (out of 5 unless noted, standard test position unless noted, lbs tested with hand held dynamometer)   5/5: LLE, RLE  Lumbar:    - Upper Abdominals: WNL     - Lower Abdominals: WNL     Palpation:     Comments / Details: Point tender in left gluts and piriformis      Oswestry: Score:  0  0-20% = minimal disability; 20-40% = moderate disability; 40-60% = severe disability; 60-80% = crippled; % = bed bound    TREATMENT                                                                                                                initial evaluation completed  Therapeutic Exercise:  Hooklying Transversus Abdominis Palpation   Supine March   Hooklying Pelvic Floor Contraction  Supine Single Bent Knee Fallout  Supine Bridge   Supine Single Knee to Chest Stretch  Supine Figure 4 Piriformis Stretch  Modified Neo Stretch    Education and discussion on plan of care, purpose of interventions, associated anatomy, listening to her body and modifying activity as  needed, diagnosis/prognosis, and need for core stabilization.     -MET shotgun technique next session?     Skilled input: verbal instruction/cues, tactile instruction/cues and as detailed above    Writer verbally educated and received verbal consent for hand placement, positioning of patient, and techniques to be performed today from patient for clothing adjustments for techniques, therapist position for techniques and hand placement and palpation for techniques as described above and how they are pertinent to the patient's plan of care.    Home Exercise Program: (*above indicates provided as part of home exercise program)  Access Code: [email or web address]   URL: [email or web address]   Date: 05/22/2020   Prepared by: Abraham Bethea      Hooklying Transversus Abdominis Palpation - 12 reps - 2 sets - 1x daily - 7x weekly   Supine March - 12 reps - 2 sets - 1x daily - 7x weekly   Hooklying Pelvic Floor Contraction - 12 reps - 2 sets - 1x daily - 7x weekly   Supine Single Bent Knee Fallout - 12 reps - 2 sets - 1x daily - 7x weekly   Supine Bridge - 12 reps - 2 sets - 5 seconds hold - 1x daily - 7x weekly   Supine Single Knee to Chest Stretch - 3 reps - 1 sets - 20 seconds hold - 1x daily - 7x weekly   Supine Figure 4 Piriformis Stretch - 3 reps - 1 sets - 30 seconds hold - 1x daily - 7x weekly   Modified Neo Stretch - 3 reps - 1 sets - 30 seconds hold - 1x daily - 7x weekly         ASSESSMENT                                                                                                             46 year old female patient has reported functional limitations listed above impacted by signs and symptoms consistent with lumbar, increased pain/symptoms, impaired posture, impaired muscle length/flexibility and impaired mobility.  Patient demonstrates mechanical lumbar pain likely associated with decreased lumbar stabilization and core strenghtening with increased muscle tightness. Postural corrections along with  stabilization and strengthening can provide patient with increased painfree functional mobility in order to continue work and daily activities.     Prognosis: patient will benefit from skilled therapy  Rehabilitative Potential: good  Predicted patient presentation: Low (stable) - Patient comorbidities and complexities, as defined above, will have little effect on progress for prescribed plan of care.    Patient Education:   Who will be receiving education: patient  Are they ready to learn: yes  Preferred learning style: written, verbal and demonstration  Barriers to learning: no barriers apparent at this time  Results of above outlined education: Verbalizes understanding, Demonstrates understanding and Needs reinforcement     PLAN                                                                                                                           The following skilled interventions to be implemented to achieve goals listed below:  Activities of Daily Living/Self Care (97668)  Dry Needling - Unlisted physical medicine/rehabilitation service or procedure (55271/54275.02)  Gait Training (81181)  Manual Therapy (96565)  Neuromuscular Re-Education (77550)  Therapeutic Activity (16691)  Therapeutic Exercise (29003)  Electrical Stimulation (97595//75242)   Heat/Cold (25432)  Mechanical Traction (07632)      Frequency / Duration: 1 times per week tapering as patient progresses for an estimated total of 8 visits for 8 weeks    patient involved in and agreed to plan of care and goals.  Patient given attendance policy at time of initial evaluation.    Suggestions for next session as indicated: Progress per plan of care, assess joint play     GOALS                                                                                                                       Long Term Goals: To be met by end of plan of care:      Home Exercise Program: Independent with progressed and modified home exercise program (HEP)    Task  Modification: Independent with instructed task modifications      Pain: Decrease pain/symptoms to 0  Range of Motion: Improve involved range of motion (ROM) to   sidebending without pain     Sit: Sit for without reported difficulty with lateral side shifitng (Changing and maintaining body position (mobility))    Patient will be able to perform standing, lifting, and walking tasks with appropriate core and lumbar stabilization and strengthening.  (Walking and moving (mobility))      Procedures and total treatment time documented Time Entry flowsheet.     [No Acute Distress] : no acute distress [Normal Oropharynx] : normal oropharynx [Normal Appearance] : normal appearance [No Neck Mass] : no neck mass [Normal Rate/Rhythm] : normal rate/rhythm [Normal S1, S2] : normal s1, s2 [No Murmurs] : no murmurs [No Resp Distress] : no resp distress [Clear to Auscultation Bilaterally] : clear to auscultation bilaterally [No Abnormalities] : no abnormalities [Benign] : benign [Normal Gait] : normal gait [No Clubbing] : no clubbing [No Cyanosis] : no cyanosis [No Edema] : no edema [FROM] : FROM [Normal Color/ Pigmentation] : normal color/ pigmentation [No Focal Deficits] : no focal deficits [Oriented x3] : oriented x3 [Normal Affect] : normal affect

## 2022-02-24 NOTE — REASON FOR VISIT
[Initial] : an initial visit [COPD] : COPD [TextBox_44] : assessment. Pt currently has no pulmonary complaints,he does have hx of pediatric asthma advised by PCP to follow up on COPD. Pt states he does not need to use DUlera very often, maybe once or twice a month.

## 2022-02-25 ENCOUNTER — RX RENEWAL (OUTPATIENT)
Age: 67
End: 2022-02-25

## 2022-02-28 ENCOUNTER — RX RENEWAL (OUTPATIENT)
Age: 67
End: 2022-02-28

## 2022-02-28 ENCOUNTER — NON-APPOINTMENT (OUTPATIENT)
Age: 67
End: 2022-02-28

## 2022-03-13 ENCOUNTER — RX RENEWAL (OUTPATIENT)
Age: 67
End: 2022-03-13

## 2022-03-25 ENCOUNTER — APPOINTMENT (OUTPATIENT)
Dept: PULMONOLOGY | Facility: CLINIC | Age: 67
End: 2022-03-25
Payer: MEDICARE

## 2022-03-25 ENCOUNTER — NON-APPOINTMENT (OUTPATIENT)
Age: 67
End: 2022-03-25

## 2022-03-25 VITALS
DIASTOLIC BLOOD PRESSURE: 91 MMHG | HEIGHT: 68 IN | OXYGEN SATURATION: 95 % | HEART RATE: 93 BPM | BODY MASS INDEX: 32.89 KG/M2 | SYSTOLIC BLOOD PRESSURE: 150 MMHG | TEMPERATURE: 98 F | WEIGHT: 217 LBS

## 2022-03-25 PROCEDURE — 94010 BREATHING CAPACITY TEST: CPT

## 2022-03-25 PROCEDURE — 99214 OFFICE O/P EST MOD 30 MIN: CPT | Mod: 25

## 2022-03-25 PROCEDURE — 94727 GAS DIL/WSHOT DETER LNG VOL: CPT

## 2022-03-25 PROCEDURE — 94729 DIFFUSING CAPACITY: CPT

## 2022-03-25 NOTE — HISTORY OF PRESENT ILLNESS
[Former] : former [Never] : never [TextBox_11] : 0.75 [TextBox_4] : 66 male hx tobacco and copd for fu visit\par feels good no sob no cough no wheeze no chest pain no tightness\par no albuterol needs\par full activity no limitations\par no nite awakening\par pft normal\par cxr normal [TextBox_13] : 30 [YearQuit] : 2005

## 2022-03-25 NOTE — PROCEDURE
[FreeTextEntry1] : Pulmonary function test performed 3/25/2022 are normal.\par Chest x-ray 2/28/2022 normal

## 2022-03-25 NOTE — DISCUSSION/SUMMARY
[FreeTextEntry1] : Mr. Crowley has COPD.  It is very mild and he denies any use of inhalers.  He still states he was using the Dulera on an as-needed basis.  I once again emphasized to him that Dulera is a maintenance therapy and that it is not to be used intermittently and I would like him to use albuterol only on an intermittent basis.  There does not seem to be need for maintenance therapy so we will gauge his use of albuterol decide further therapy is indicated.  I also had an extensive discussion with him regarding lung cancer screening.  He does not fit into the criteria at this time and he is quite content with no CAT scan.  The patient understands and agrees with this plan of care.\par The patient understands and agrees with plan of care.\par Today's office visit encompassed 32 minutes. I conducted an extensive history ,physical exam and reviewed diagnosis and treatment options  including diagnostic tests,radiologic studies including  cat scans  and the use of prescription medication.

## 2022-04-05 ENCOUNTER — RX RENEWAL (OUTPATIENT)
Age: 67
End: 2022-04-05

## 2022-05-01 ENCOUNTER — RX RENEWAL (OUTPATIENT)
Age: 67
End: 2022-05-01

## 2022-05-23 NOTE — H&P PST ADULT - MALLAMPATI CLASS
Otc Regimen: Vaseline Detail Level: Zone Class II - visualization of the soft palate, fauces, and uvula

## 2022-06-02 ENCOUNTER — RX RENEWAL (OUTPATIENT)
Age: 67
End: 2022-06-02

## 2022-06-25 ENCOUNTER — RX RENEWAL (OUTPATIENT)
Age: 67
End: 2022-06-25

## 2022-07-22 ENCOUNTER — RX RENEWAL (OUTPATIENT)
Age: 67
End: 2022-07-22

## 2022-08-02 ENCOUNTER — RX RENEWAL (OUTPATIENT)
Age: 67
End: 2022-08-02

## 2022-09-07 ENCOUNTER — APPOINTMENT (OUTPATIENT)
Dept: FAMILY MEDICINE | Facility: CLINIC | Age: 67
End: 2022-09-07

## 2022-09-07 VITALS
WEIGHT: 217 LBS | HEART RATE: 77 BPM | RESPIRATION RATE: 14 BRPM | HEIGHT: 68 IN | OXYGEN SATURATION: 97 % | SYSTOLIC BLOOD PRESSURE: 130 MMHG | DIASTOLIC BLOOD PRESSURE: 76 MMHG | BODY MASS INDEX: 32.89 KG/M2

## 2022-09-07 VITALS — TEMPERATURE: 97.2 F

## 2022-09-07 DIAGNOSIS — R40.0 SOMNOLENCE: ICD-10-CM

## 2022-09-07 PROCEDURE — 99214 OFFICE O/P EST MOD 30 MIN: CPT

## 2022-09-07 RX ORDER — ALBUTEROL SULFATE 90 UG/1
108 (90 BASE) INHALANT RESPIRATORY (INHALATION) EVERY 4 HOURS
Qty: 1 | Refills: 6 | Status: ACTIVE | COMMUNITY
Start: 2022-02-24 | End: 1900-01-01

## 2022-09-07 RX ORDER — CAPSAICIN 0.1 G/100G
0.1 CREAM TOPICAL 3 TIMES DAILY
Qty: 1 | Refills: 2 | Status: ACTIVE | COMMUNITY
Start: 2022-09-07 | End: 1900-01-01

## 2022-09-07 RX ORDER — METHOCARBAMOL 750 MG/1
750 TABLET, FILM COATED ORAL TWICE DAILY
Qty: 120 | Refills: 1 | Status: DISCONTINUED | COMMUNITY
End: 2022-09-07

## 2022-09-07 RX ORDER — IPRATROPIUM BROMIDE 42 UG/1
0.06 SPRAY NASAL 3 TIMES DAILY
Qty: 1 | Refills: 1 | Status: ACTIVE | COMMUNITY
Start: 2021-08-10 | End: 1900-01-01

## 2022-09-07 RX ORDER — BUSPIRONE HYDROCHLORIDE 5 MG/1
5 TABLET ORAL TWICE DAILY
Qty: 180 | Refills: 2 | Status: ACTIVE | COMMUNITY
Start: 2017-03-31 | End: 1900-01-01

## 2022-09-07 RX ORDER — MOMETASONE FUROATE AND FORMOTEROL FUMARATE DIHYDRATE 200; 5 UG/1; UG/1
200-5 AEROSOL RESPIRATORY (INHALATION)
Qty: 1 | Refills: 6 | Status: DISCONTINUED | COMMUNITY
Start: 2017-04-25 | End: 2022-09-07

## 2022-09-07 NOTE — PHYSICAL EXAM
[No Lymphadenopathy] : no lymphadenopathy [No Respiratory Distress] : no respiratory distress  [No Accessory Muscle Use] : no accessory muscle use [Clear to Auscultation] : lungs were clear to auscultation bilaterally [Normal Rate] : normal rate  [Regular Rhythm] : with a regular rhythm [Normal S1, S2] : normal S1 and S2 [No Edema] : there was no peripheral edema [Soft] : abdomen soft [Non Tender] : non-tender [Non-distended] : non-distended [No Spinal Tenderness] : no spinal tenderness [Grossly Normal Strength/Tone] : grossly normal strength/tone [Coordination Grossly Intact] : coordination grossly intact [Normal Gait] : normal gait [Normal] : affect was normal and insight and judgment were intact [de-identified] : obese

## 2022-09-07 NOTE — REVIEW OF SYSTEMS
[Fatigue] : fatigue [Negative] : Heme/Lymph [Fever] : no fever [Chills] : no chills [Hot Flashes] : no hot flashes [Headache] : no headache [Dizziness] : no dizziness [Unsteady Walk] : no ataxia [de-identified] : b/l hand paresthesia and pain

## 2022-09-07 NOTE — HISTORY OF PRESENT ILLNESS
[FreeTextEntry1] : Patient presents to refill medications\par Patient also c/o that he's been feeling very tired lately\par  [de-identified] : 67 yo male, multiple comorbidities presenting today for follow up to refill medications. \par Has been dealing with peripheral neuropathy in b/l hands that is worsening. \par He feels a shocking pain in hands at times. \par Washing hands described as washing with razor blades. \par Has been slowly worsening. \par Says lyrica barely helps, would like to see if anything else can be done. \par \par Has another complaint of also feeling very tried\par Says for last year, falls asleep more easily. \par Also has more daytime somnolence. \par Says he could be sitting and then pass out and fall asleep.\par Says patient does snore. Has never been formally tested for MILANA.

## 2022-09-07 NOTE — PLAN
[FreeTextEntry1] : HTN- stable\par -Continue current regimen of norvasc, arb\par -Continue regular bp checks at home and advised to bring machine to office with readings next visit\par -Avoid salt\par -encouraged diet and exercise\par -Labs ordered to evaluate for renal function with cmp, blood counts with cbc\par \par COPD\par chronic, stable\par renewed albuterol \par does not take maintenance inhaler \par \par Chronic Fatigue \par complicated by Daytime Somnolence\par new undiagnosed problem, uncertain etiology at this time\par sleep study ordered to eval for MILANA\par thyroid studies ordered\par b12, cbc ordered to eval for anemia \par due to use of lyrica and buspirone, potential for sedation effect, cutting dose of buspirone to 5mg bid down from 10mg bid to assess for possible medication side effect as etiology of symptoms \par \par Neuropathy\par chronic, worsening\par c/w lyrica, renewed today\par added capsaicin topical to help with pain relief\par if no improvement in month, will consider starting effexor \par neurology referral \par \par f/u 1 month for neuropathy\par labs drawn in office and patient will be notified of results when available\par Patient agrees with plan, all further questions answered during encounter.\par

## 2022-09-08 ENCOUNTER — TRANSCRIPTION ENCOUNTER (OUTPATIENT)
Age: 67
End: 2022-09-08

## 2022-09-08 LAB
ALBUMIN SERPL ELPH-MCNC: 4.7 G/DL
ALP BLD-CCNC: 103 U/L
ALT SERPL-CCNC: 26 U/L
ANION GAP SERPL CALC-SCNC: 12 MMOL/L
AST SERPL-CCNC: 18 U/L
BASOPHILS # BLD AUTO: 0.05 K/UL
BASOPHILS NFR BLD AUTO: 0.5 %
BILIRUB SERPL-MCNC: 0.5 MG/DL
BUN SERPL-MCNC: 17 MG/DL
CALCIUM SERPL-MCNC: 9.8 MG/DL
CHLORIDE SERPL-SCNC: 106 MMOL/L
CO2 SERPL-SCNC: 24 MMOL/L
CREAT SERPL-MCNC: 0.97 MG/DL
EGFR: 85 ML/MIN/1.73M2
EOSINOPHIL # BLD AUTO: 0.32 K/UL
EOSINOPHIL NFR BLD AUTO: 3 %
FOLATE SERPL-MCNC: 15.2 NG/ML
GLUCOSE SERPL-MCNC: 103 MG/DL
HCT VFR BLD CALC: 46.3 %
HGB BLD-MCNC: 15.1 G/DL
IMM GRANULOCYTES NFR BLD AUTO: 0.3 %
LYMPHOCYTES # BLD AUTO: 2.55 K/UL
LYMPHOCYTES NFR BLD AUTO: 24.1 %
MAN DIFF?: NORMAL
MCHC RBC-ENTMCNC: 30 PG
MCHC RBC-ENTMCNC: 32.6 GM/DL
MCV RBC AUTO: 91.9 FL
MONOCYTES # BLD AUTO: 1.05 K/UL
MONOCYTES NFR BLD AUTO: 9.9 %
NEUTROPHILS # BLD AUTO: 6.6 K/UL
NEUTROPHILS NFR BLD AUTO: 62.2 %
PLATELET # BLD AUTO: 291 K/UL
POTASSIUM SERPL-SCNC: 5.1 MMOL/L
PROT SERPL-MCNC: 7.4 G/DL
RBC # BLD: 5.04 M/UL
RBC # FLD: 15.1 %
SODIUM SERPL-SCNC: 142 MMOL/L
T4 FREE SERPL-MCNC: 1.1 NG/DL
TSH SERPL-ACNC: 1.43 UIU/ML
VIT B12 SERPL-MCNC: 761 PG/ML
WBC # FLD AUTO: 10.6 K/UL

## 2022-09-09 ENCOUNTER — TRANSCRIPTION ENCOUNTER (OUTPATIENT)
Age: 67
End: 2022-09-09

## 2022-09-13 RX ORDER — AMLODIPINE AND ATORVASTATIN 10; 40 MG/1; MG/1
10-40 TABLET, COATED ORAL
Qty: 90 | Refills: 2 | Status: DISCONTINUED | COMMUNITY
Start: 2021-05-07 | End: 2022-09-13

## 2022-09-24 ENCOUNTER — RX RENEWAL (OUTPATIENT)
Age: 67
End: 2022-09-24

## 2022-09-24 ENCOUNTER — TRANSCRIPTION ENCOUNTER (OUTPATIENT)
Age: 67
End: 2022-09-24

## 2022-09-26 ENCOUNTER — APPOINTMENT (OUTPATIENT)
Dept: PULMONOLOGY | Facility: CLINIC | Age: 67
End: 2022-09-26

## 2022-09-28 ENCOUNTER — APPOINTMENT (OUTPATIENT)
Dept: PULMONOLOGY | Facility: CLINIC | Age: 67
End: 2022-09-28

## 2022-10-11 ENCOUNTER — APPOINTMENT (OUTPATIENT)
Dept: FAMILY MEDICINE | Facility: CLINIC | Age: 67
End: 2022-10-11

## 2022-10-11 VITALS
WEIGHT: 217 LBS | OXYGEN SATURATION: 97 % | HEART RATE: 73 BPM | HEIGHT: 68 IN | SYSTOLIC BLOOD PRESSURE: 112 MMHG | RESPIRATION RATE: 14 BRPM | DIASTOLIC BLOOD PRESSURE: 80 MMHG | BODY MASS INDEX: 32.89 KG/M2

## 2022-10-11 VITALS — TEMPERATURE: 97.3 F

## 2022-10-11 DIAGNOSIS — R53.82 CHRONIC FATIGUE, UNSPECIFIED: ICD-10-CM

## 2022-10-11 DIAGNOSIS — T88.7XXA UNSPECIFIED ADVERSE EFFECT OF DRUG OR MEDICAMENT, INITIAL ENCOUNTER: ICD-10-CM

## 2022-10-11 DIAGNOSIS — M50.10 CERVICAL DISC DISORDER WITH RADICULOPATHY, UNSPECIFIED CERVICAL REGION: ICD-10-CM

## 2022-10-11 PROCEDURE — 99214 OFFICE O/P EST MOD 30 MIN: CPT

## 2022-10-11 NOTE — HISTORY OF PRESENT ILLNESS
[FreeTextEntry1] : patient presents for medication renewal \par follow up of fatigue  [de-identified] : 65 yo male, multiple comorbidities presenting today for follow up to refill medications. \par Also follows up today for fatigue. Says that since cutting down on buspirone previously, he says that he has not felt as much fatigue and sleepiness. He did not do sleep study because he said he felt better. He is also being more active during the day and he felt that being sedentary did not help. Being more active, he says he is not as tired anymore. \par Requests refills for lyrica and naproxen for neuropathy and cervical disc disorder. \par No other complaints today.

## 2022-10-11 NOTE — PHYSICAL EXAM
[No Respiratory Distress] : no respiratory distress  [No Accessory Muscle Use] : no accessory muscle use [Clear to Auscultation] : lungs were clear to auscultation bilaterally [Normal Rate] : normal rate  [Regular Rhythm] : with a regular rhythm [Normal S1, S2] : normal S1 and S2 [Soft] : abdomen soft [Non Tender] : non-tender [Normal Bowel Sounds] : normal bowel sounds [Coordination Grossly Intact] : coordination grossly intact [No Focal Deficits] : no focal deficits [Normal Gait] : normal gait [No Spinal Tenderness] : no spinal tenderness [Normal] : affect was normal and insight and judgment were intact

## 2022-10-11 NOTE — ASSESSMENT
[FreeTextEntry1] : Cervical Disc disorder with radiculopathy\par chronic, stable\par controlled with PO naproxen and lyrica\par renewed today \par \par Neuropathy\par chronic, stable\par Reference #: [id number]\par alleviated with lyrica, renewed today\par \par Fatigue\par did not do sleep study\par likely 2/2 medication side effect of lyrica and buspirone\par now on reduced dose of busiprone, improved fatigue with less sedation. \par advised to monitor for symptoms and if noted to recur, we will further consider dose reduction and possibly pursue sleep study at that time as patient may still have component of MILANA.\par reviewed tsh and t4 from 9/2022 wnl\par cbc noted, no significant anemia in cbc from 9/2022\par \par f/u 3 months\par Patient agrees with plan, all further questions answered during encounter.\par

## 2022-10-11 NOTE — REVIEW OF SYSTEMS
[Joint Pain] : joint pain [Negative] : Neurological [Joint Stiffness] : no joint stiffness [Muscle Pain] : no muscle pain [Muscle Weakness] : no muscle weakness [Back Pain] : no back pain [Joint Swelling] : no joint swelling [FreeTextEntry9] : chronic neck pain

## 2022-10-24 ENCOUNTER — RX RENEWAL (OUTPATIENT)
Age: 67
End: 2022-10-24

## 2022-10-24 RX ORDER — IPRATROPIUM BROMIDE 21 UG/1
0.03 SPRAY NASAL 3 TIMES DAILY
Qty: 84 | Refills: 0 | Status: ACTIVE | COMMUNITY
Start: 2022-03-13 | End: 1900-01-01

## 2022-11-29 ENCOUNTER — RX RENEWAL (OUTPATIENT)
Age: 67
End: 2022-11-29

## 2022-11-30 ENCOUNTER — RX RENEWAL (OUTPATIENT)
Age: 67
End: 2022-11-30

## 2022-12-28 ENCOUNTER — APPOINTMENT (OUTPATIENT)
Dept: FAMILY MEDICINE | Facility: CLINIC | Age: 67
End: 2022-12-28
Payer: MEDICARE

## 2022-12-28 DIAGNOSIS — H69.83 OTHER SPECIFIED DISORDERS OF EUSTACHIAN TUBE, BILATERAL: ICD-10-CM

## 2022-12-28 DIAGNOSIS — U07.1 COVID-19: ICD-10-CM

## 2022-12-28 DIAGNOSIS — H91.93 UNSPECIFIED HEARING LOSS, BILATERAL: ICD-10-CM

## 2022-12-28 DIAGNOSIS — R05.9 COUGH, UNSPECIFIED: ICD-10-CM

## 2022-12-28 DIAGNOSIS — R09.82 POSTNASAL DRIP: ICD-10-CM

## 2022-12-28 PROCEDURE — 99214 OFFICE O/P EST MOD 30 MIN: CPT | Mod: CS,95

## 2022-12-28 RX ORDER — IPRATROPIUM BROMIDE 21 UG/1
0.03 SPRAY NASAL
Qty: 90 | Refills: 0 | Status: ACTIVE | COMMUNITY
Start: 2022-12-28 | End: 1900-01-01

## 2022-12-28 NOTE — REVIEW OF SYSTEMS
[Earache] : earache [Hearing Loss] : hearing loss [Postnasal Drip] : postnasal drip [Nasal Discharge] : nasal discharge [Sore Throat] : sore throat [Hoarseness] : hoarseness [Shortness Of Breath] : no shortness of breath [Wheezing] : no wheezing [Cough] : cough [Negative] : Neurological

## 2022-12-28 NOTE — HISTORY OF PRESENT ILLNESS
[Home] : at home, [unfilled] , at the time of the visit. [Medical Office: ([facility])___] : at the medical office located in  [Verbal consent obtained from patient] : the patient, [unfilled] [FreeTextEntry8] : 68 yo male, hx of COPD who presents via telehealth for covid19 infection. \par Says that he yesterday tested with home test.\par He had mild symptoms of cough, post nasal drip but his family had covid last week.  \par [facility]. \par He still has a cough. \par Still with post nasal drip. \par no chest pain or dyspnea. \par Says that both ears are feeling clogged with hearing popping.   \par No fever. \par No other symptoms.

## 2022-12-28 NOTE — PHYSICAL EXAM
[Normal] : no acute distress, well nourished, well developed and well-appearing [de-identified] : speaking full sentences w/o distress

## 2022-12-28 NOTE — ASSESSMENT
[FreeTextEntry1] : COVID with PND and Cough\par paxlovid reviewed with patient and was declined due to mild nature of symptoms\par Condition complicated by suspected B/L eustachian tube dysfunction secondary to post nasal drip causing patient to experience difficulty with hearing in b/l ears. \par Provided guidance on exercises to perform valsalva maneuver to help open up eustachian tube to improve middle ear drainage\par Atrovent nasal spray to dry out post nasal drip, advised to avoid if headache or nose bleeds\par Medrol dose pack to help decongest, advised of steroid side effects including insomnia and GI upset and to take with food. \par Tessalon perles to help with cough symptoms.\par Advised to purchase sinus rinse kit for sinus irrigation\par Patient educated about signs and symptoms of severe covid and when to seek higher level of care. \par Patient advised to continue any prescribed and OTC medication for symptomatic relief. \par Patient also advised to call back if symptoms worsening  \par \par f/u as needed \par Patient agrees with plan, all further questions answered during encounter.\par \par \par \par

## 2023-01-17 ENCOUNTER — APPOINTMENT (OUTPATIENT)
Dept: FAMILY MEDICINE | Facility: CLINIC | Age: 68
End: 2023-01-17
Payer: MEDICARE

## 2023-01-17 VITALS
TEMPERATURE: 97.2 F | HEIGHT: 68 IN | DIASTOLIC BLOOD PRESSURE: 80 MMHG | SYSTOLIC BLOOD PRESSURE: 138 MMHG | HEART RATE: 79 BPM | RESPIRATION RATE: 15 BRPM | WEIGHT: 230 LBS | BODY MASS INDEX: 34.86 KG/M2 | OXYGEN SATURATION: 98 %

## 2023-01-17 DIAGNOSIS — J44.9 CHRONIC OBSTRUCTIVE PULMONARY DISEASE, UNSPECIFIED: ICD-10-CM

## 2023-01-17 PROCEDURE — 99214 OFFICE O/P EST MOD 30 MIN: CPT | Mod: 25

## 2023-01-17 PROCEDURE — 90732 PPSV23 VACC 2 YRS+ SUBQ/IM: CPT

## 2023-01-17 PROCEDURE — G0009: CPT

## 2023-01-17 RX ORDER — METHYLPREDNISOLONE 4 MG/1
4 TABLET ORAL
Qty: 1 | Refills: 0 | Status: DISCONTINUED | COMMUNITY
Start: 2022-12-28 | End: 2023-01-17

## 2023-01-17 RX ORDER — BENZONATATE 100 MG/1
100 CAPSULE ORAL 3 TIMES DAILY
Qty: 45 | Refills: 0 | Status: DISCONTINUED | COMMUNITY
Start: 2022-12-28 | End: 2023-01-17

## 2023-01-17 NOTE — HISTORY OF PRESENT ILLNESS
[FreeTextEntry1] : Patient presents for a follow up from having covid states he is feeling well also needs a renewal of Lyrica  [de-identified] : 66 yo male, hx of COPD, back pain, htn, hld who presents today for follow up. \clifton Recently had covid during new years but was asymptomatic. \clifton Says he needs refills for lyrica for long standing neuropathy in hands with hx of carpal tunnels never improved with surgery. \clifton Says lyrica has been helping him with pain. \clifton Says that he is not fasting today for labs. Will go to outside lab. \clifton Accepts pneumovax shot today.

## 2023-01-17 NOTE — PHYSICAL EXAM
[No Respiratory Distress] : no respiratory distress  [No Accessory Muscle Use] : no accessory muscle use [Clear to Auscultation] : lungs were clear to auscultation bilaterally [Normal Rate] : normal rate  [Regular Rhythm] : with a regular rhythm [Normal S1, S2] : normal S1 and S2 [No Edema] : there was no peripheral edema [Soft] : abdomen soft [Non Tender] : non-tender [Non-distended] : non-distended [Grossly Normal Strength/Tone] : grossly normal strength/tone [Coordination Grossly Intact] : coordination grossly intact [No Focal Deficits] : no focal deficits [Normal Gait] : normal gait [Normal] : affect was normal and insight and judgment were intact [de-identified] : obese

## 2023-01-17 NOTE — ASSESSMENT
[FreeTextEntry1] : HTN- stable\par -Continue current regimen of norvasc, losartan\par -Continue regular bp checks at home and advised to bring machine to office with readings next visit\par -Avoid salt\par -encouraged diet and exercise\par -Labs ordered to evaluate for renal function with cmp, blood counts with cbc\par \par HLD\par continue statin use \par LFTs today\par Fasting lipid profile ordered today.\par \par Neuropathy\par Reference #: [id number]\par renewed lyrica 100mg tid\par no longer feeling sedated now that we decreased buspirone to 5mg\par advised to monitor for any new signs of fatigue/sedation. \par urine comprehensive ordered to outside lab\par \par COPD\par pulm consult mar 2022 noted\par was mild with copd symptoms\par c/w albuterol prn \par \par f/u 1 month for neuropathy controlled refills\par labs to be done at outside lab, will notify patient of results when available and received.\par Patient agrees with plan, all further questions answered during encounter.\par \par

## 2023-02-21 ENCOUNTER — APPOINTMENT (OUTPATIENT)
Dept: FAMILY MEDICINE | Facility: CLINIC | Age: 68
End: 2023-02-21
Payer: MEDICARE

## 2023-02-21 VITALS
HEART RATE: 93 BPM | SYSTOLIC BLOOD PRESSURE: 122 MMHG | HEIGHT: 68 IN | DIASTOLIC BLOOD PRESSURE: 82 MMHG | RESPIRATION RATE: 14 BRPM | WEIGHT: 230 LBS | BODY MASS INDEX: 34.86 KG/M2 | OXYGEN SATURATION: 98 % | TEMPERATURE: 96.4 F

## 2023-02-21 DIAGNOSIS — E78.5 HYPERLIPIDEMIA, UNSPECIFIED: ICD-10-CM

## 2023-02-21 DIAGNOSIS — R73.03 PREDIABETES.: ICD-10-CM

## 2023-02-21 DIAGNOSIS — R73.9 HYPERGLYCEMIA, UNSPECIFIED: ICD-10-CM

## 2023-02-21 PROCEDURE — 99214 OFFICE O/P EST MOD 30 MIN: CPT

## 2023-02-21 NOTE — PHYSICAL EXAM
[Normal] : no respiratory distress, lungs were clear to auscultation bilaterally and no accessory muscle use [Normal Rate] : normal rate  [Regular Rhythm] : with a regular rhythm [Normal S1, S2] : normal S1 and S2 [No Edema] : there was no peripheral edema [Soft] : abdomen soft [Non Tender] : non-tender [Non-distended] : non-distended [Normal Bowel Sounds] : normal bowel sounds [No Spinal Tenderness] : no spinal tenderness [Coordination Grossly Intact] : coordination grossly intact [No Focal Deficits] : no focal deficits [Normal Gait] : normal gait [de-identified] : obese

## 2023-02-21 NOTE — ASSESSMENT
[FreeTextEntry1] : Neuropathy\par chronic\par Reference #: [id number]\par mild improvement in neuropathy of left hand\par will renewed lyrica at current dose, 200mg bid\par advised if no improvement in right hand neuropathy, may require referral again to ortho/neuro or pain management. \par patient agrees with this plan as of now. Will re-assess in 2 months. \par \par HLD\par chronic \par continue statin use\par Lipid panel reviewed from 2/14/2023\par LFTs wnl from 2/14/2023\par Fasting lipid profile ordered today.\par \par Pre-DM\par chronic elevated glucose \par cmp from 2/14/23 noted glucose at 112, increased from 103 previously noted in cmp from 9/2022\par advised pt needs to watch diet, cut down on sweets and try to lose weight. \par patient says he will try to start exercising more and try to lose some weight. \par \par f/u 2 months for neuropathy\par Patient agrees with plan, all further questions answered during encounter.\par

## 2023-02-21 NOTE — HISTORY OF PRESENT ILLNESS
[FreeTextEntry1] : patient presents for f/u 1 month for neuropathy and lab results  [de-identified] : 68 yo male, hx of COPD, back pain, htn, hld who presents today for follow up. \par Says noted slight improvement in left hand with increased lyrica dosing. \par Right still with some "zapping" pain. Would like to continue trying lyrica at current dose.\par Wants to discuss recent lab results he had done. Says he has now been trying to work on diet to improve sugar. No other complaints today. \par

## 2023-03-30 ENCOUNTER — RX RENEWAL (OUTPATIENT)
Age: 68
End: 2023-03-30

## 2023-04-04 ENCOUNTER — RX RENEWAL (OUTPATIENT)
Age: 68
End: 2023-04-04

## 2023-04-10 ENCOUNTER — APPOINTMENT (OUTPATIENT)
Dept: FAMILY MEDICINE | Facility: CLINIC | Age: 68
End: 2023-04-10
Payer: MEDICARE

## 2023-04-10 VITALS
TEMPERATURE: 98.8 F | HEART RATE: 91 BPM | RESPIRATION RATE: 14 BRPM | OXYGEN SATURATION: 96 % | SYSTOLIC BLOOD PRESSURE: 120 MMHG | HEIGHT: 68 IN | BODY MASS INDEX: 34.4 KG/M2 | WEIGHT: 227 LBS | DIASTOLIC BLOOD PRESSURE: 80 MMHG

## 2023-04-10 DIAGNOSIS — I10 ESSENTIAL (PRIMARY) HYPERTENSION: ICD-10-CM

## 2023-04-10 DIAGNOSIS — H66.001 ACUTE SUPPURATIVE OTITIS MEDIA W/OUT SPONTANEOUS RUPTURE OF EAR DRUM, RIGHT EAR: ICD-10-CM

## 2023-04-10 DIAGNOSIS — G56.90 UNSPECIFIED MONONEUROPATHY OF UNSPECIFIED UPPER LIMB: ICD-10-CM

## 2023-04-10 DIAGNOSIS — M62.838 OTHER MUSCLE SPASM: ICD-10-CM

## 2023-04-10 PROCEDURE — 99214 OFFICE O/P EST MOD 30 MIN: CPT

## 2023-04-10 RX ORDER — OFLOXACIN OTIC 3 MG/ML
0.3 SOLUTION AURICULAR (OTIC) TWICE DAILY
Qty: 1 | Refills: 0 | Status: ACTIVE | COMMUNITY
Start: 2023-04-10 | End: 1900-01-01

## 2023-04-10 NOTE — HISTORY OF PRESENT ILLNESS
[FreeTextEntry8] : patient c/o right ear pain X 1 day \par patient c/o pain on right side of neck and left ear X 3 weeks \par \par 68 yo male,  hx of COPD, back pain, htn, hld who presents today for follow up. \par Says that he has right ear ache symptoms, was using qtip and thinks he irritated the canal.\par Thinks it might be infected. \par \par Also with complaint of neck pain started 3 weeks ago. Tried to massage it out but gets some relief. \par Usually bends down head and muscles in neck causes pain to occur. \par Has tried some nsaids with minimal relief. \par \par Regarding chronic neuropathy, needs refills for pre-gabalin. It provides him relief and helps keep pain from neuropathy under control. \par No other complaints.

## 2023-04-10 NOTE — ASSESSMENT
[FreeTextEntry1] : Acute Otitis Media right\par -Antibiotics prescribed, ofloxacin otic 5 ggt bid x 5 days \par -Probiotic po daily\par Return in 3 days if no relief\par \par Neck Muscle Spasm\par -Physical therapy 3x per week x 6 weeks, referral given\par -Massage therapy script provided\par c/w naproxen 500mg bid prn already filled to pharmacy\par \par Neuropathy\par chronic \par Ref [id number]\par renewed pregabalin 200mg bid, rx to pharmacy \par avoid with other sedatives \par \par HTN- stable\par -Continue current regimen of norvasc 10mg and losartan 100mg daily \par -Continue regular bp checks at home and advised to bring machine to office with readings next visit\par -Avoid salt\par -encouraged diet and exercise\par f/u 3 months, pt agreed w/plan\par \par f/u 3 months \par Patient agrees with plan, all further questions answered during encounter.\par

## 2023-04-10 NOTE — PHYSICAL EXAM
[No Lymphadenopathy] : no lymphadenopathy [No Respiratory Distress] : no respiratory distress  [No Accessory Muscle Use] : no accessory muscle use [Clear to Auscultation] : lungs were clear to auscultation bilaterally [Normal] : normal rate, regular rhythm, normal S1 and S2 and no murmur heard [No Spinal Tenderness] : no spinal tenderness [Coordination Grossly Intact] : coordination grossly intact [No Focal Deficits] : no focal deficits [Normal Gait] : normal gait [de-identified] : right TM noted with bulge and wax pushed up against the base of the drug with significant canal erythema. Rt drum appearing to be intact. left TM normal appearing [de-identified] : tender left trapezium and left paracervical muscle

## 2023-04-10 NOTE — REVIEW OF SYSTEMS
[Earache] : earache [Muscle Pain] : muscle pain [Back Pain] : back pain [Negative] : Neurological [Hearing Loss] : no hearing loss [Joint Pain] : no joint pain [Joint Stiffness] : no joint stiffness [Muscle Weakness] : no muscle weakness [Joint Swelling] : no joint swelling

## 2023-04-19 ENCOUNTER — APPOINTMENT (OUTPATIENT)
Dept: FAMILY MEDICINE | Facility: CLINIC | Age: 68
End: 2023-04-19

## 2023-05-29 ENCOUNTER — RX RENEWAL (OUTPATIENT)
Age: 68
End: 2023-05-29

## 2023-05-29 RX ORDER — AMLODIPINE BESYLATE 10 MG/1
10 TABLET ORAL DAILY
Qty: 90 | Refills: 2 | Status: ACTIVE | COMMUNITY
Start: 2022-09-13 | End: 1900-01-01

## 2023-05-29 RX ORDER — ATORVASTATIN CALCIUM 40 MG/1
40 TABLET, FILM COATED ORAL
Qty: 90 | Refills: 2 | Status: ACTIVE | COMMUNITY
Start: 2022-09-13 | End: 1900-01-01

## 2023-06-06 ENCOUNTER — RX RENEWAL (OUTPATIENT)
Age: 68
End: 2023-06-06

## 2023-06-06 RX ORDER — LOSARTAN POTASSIUM 100 MG/1
100 TABLET, FILM COATED ORAL
Qty: 90 | Refills: 2 | Status: ACTIVE | COMMUNITY
Start: 2021-03-29 | End: 1900-01-01

## 2023-06-09 ENCOUNTER — RX RENEWAL (OUTPATIENT)
Age: 68
End: 2023-06-09

## 2023-06-09 RX ORDER — LEVOCETIRIZINE DIHYDROCHLORIDE 5 MG/1
5 TABLET ORAL
Qty: 90 | Refills: 2 | Status: ACTIVE | COMMUNITY
Start: 2017-04-07 | End: 1900-01-01

## 2023-06-13 RX ORDER — PREGABALIN 100 MG/1
100 CAPSULE ORAL
Qty: 120 | Refills: 0 | Status: ACTIVE | COMMUNITY
Start: 2019-11-22 | End: 1900-01-01

## 2023-06-23 ENCOUNTER — RX RENEWAL (OUTPATIENT)
Age: 68
End: 2023-06-23

## 2023-07-20 ENCOUNTER — RX RENEWAL (OUTPATIENT)
Age: 68
End: 2023-07-20

## 2023-07-20 RX ORDER — NAPROXEN 500 MG/1
500 TABLET ORAL TWICE DAILY
Qty: 60 | Refills: 0 | Status: ACTIVE | COMMUNITY
Start: 2021-05-07 | End: 1900-01-01

## 2023-08-05 ENCOUNTER — TRANSCRIPTION ENCOUNTER (OUTPATIENT)
Age: 68
End: 2023-08-05

## 2023-08-05 ENCOUNTER — RX RENEWAL (OUTPATIENT)
Age: 68
End: 2023-08-05

## 2023-08-24 ENCOUNTER — RX RENEWAL (OUTPATIENT)
Age: 68
End: 2023-08-24

## 2024-03-15 ENCOUNTER — RX RENEWAL (OUTPATIENT)
Age: 69
End: 2024-03-15

## 2024-03-15 RX ORDER — BUSPIRONE HYDROCHLORIDE 10 MG/1
10 TABLET ORAL
Qty: 180 | Refills: 2 | Status: ACTIVE | COMMUNITY
Start: 2023-08-05 | End: 1900-01-01

## 2025-02-05 ENCOUNTER — RX RENEWAL (OUTPATIENT)
Age: 70
End: 2025-02-05

## 2025-08-09 ENCOUNTER — NON-APPOINTMENT (OUTPATIENT)
Age: 70
End: 2025-08-09

## 2025-08-11 ENCOUNTER — APPOINTMENT (OUTPATIENT)
Dept: UROLOGY | Facility: CLINIC | Age: 70
End: 2025-08-11
Payer: MEDICARE

## 2025-08-11 VITALS
HEIGHT: 68 IN | SYSTOLIC BLOOD PRESSURE: 116 MMHG | HEART RATE: 75 BPM | DIASTOLIC BLOOD PRESSURE: 66 MMHG | TEMPERATURE: 97.3 F | BODY MASS INDEX: 31.07 KG/M2 | WEIGHT: 205 LBS

## 2025-08-11 DIAGNOSIS — R35.1 NOCTURIA: ICD-10-CM

## 2025-08-11 LAB
APPEARANCE: CLEAR
BILIRUBIN URINE: NEGATIVE
BLOOD URINE: NEGATIVE
COLOR: YELLOW
GLUCOSE QUALITATIVE U: NEGATIVE
KETONES URINE: NEGATIVE
LEUKOCYTE ESTERASE URINE: NEGATIVE
NITRITE URINE: NEGATIVE
PH URINE: 5.5
PROTEIN URINE: NEGATIVE
SPECIFIC GRAVITY URINE: 1.02
UROBILINOGEN URINE: 0.2 (ref 0.2–?)

## 2025-08-11 PROCEDURE — 99204 OFFICE O/P NEW MOD 45 MIN: CPT

## 2025-08-11 PROCEDURE — 51798 US URINE CAPACITY MEASURE: CPT

## 2025-08-11 RX ORDER — OXYBUTYNIN CHLORIDE 10 MG/1
10 TABLET, EXTENDED RELEASE ORAL
Qty: 60 | Refills: 2 | Status: ACTIVE | COMMUNITY
Start: 2025-08-11 | End: 1900-01-01

## 2025-08-11 RX ORDER — CELECOXIB 200 MG/1
200 CAPSULE ORAL
Refills: 0 | Status: ACTIVE | COMMUNITY

## 2025-08-27 ENCOUNTER — APPOINTMENT (OUTPATIENT)
Dept: ULTRASOUND IMAGING | Facility: CLINIC | Age: 70
End: 2025-08-27
Payer: MEDICARE

## 2025-08-27 PROCEDURE — 76857 US EXAM PELVIC LIMITED: CPT | Mod: TC

## 2025-09-11 ENCOUNTER — APPOINTMENT (OUTPATIENT)
Dept: UROLOGY | Facility: CLINIC | Age: 70
End: 2025-09-11
Payer: MEDICARE

## 2025-09-11 VITALS
DIASTOLIC BLOOD PRESSURE: 67 MMHG | TEMPERATURE: 98.2 F | WEIGHT: 205 LBS | BODY MASS INDEX: 31.07 KG/M2 | HEART RATE: 71 BPM | SYSTOLIC BLOOD PRESSURE: 136 MMHG | HEIGHT: 68 IN

## 2025-09-11 DIAGNOSIS — Z12.5 ENCOUNTER FOR SCREENING FOR MALIGNANT NEOPLASM OF PROSTATE: ICD-10-CM

## 2025-09-11 DIAGNOSIS — R35.1 NOCTURIA: ICD-10-CM

## 2025-09-11 PROCEDURE — 99214 OFFICE O/P EST MOD 30 MIN: CPT
